# Patient Record
Sex: FEMALE | URBAN - METROPOLITAN AREA
[De-identification: names, ages, dates, MRNs, and addresses within clinical notes are randomized per-mention and may not be internally consistent; named-entity substitution may affect disease eponyms.]

---

## 2021-07-21 ENCOUNTER — APPOINTMENT (RX ONLY)
Dept: URBAN - METROPOLITAN AREA CLINIC 87 | Facility: CLINIC | Age: 71
Setting detail: DERMATOLOGY
End: 2021-07-21

## 2021-07-21 VITALS — TEMPERATURE: 97.5 F

## 2021-07-21 DIAGNOSIS — L03.01 CELLULITIS OF FINGER: ICD-10-CM

## 2021-07-21 PROBLEM — L03.012 CELLULITIS OF LEFT FINGER: Status: ACTIVE | Noted: 2021-07-21

## 2021-07-21 PROCEDURE — ? TREATMENT REGIMEN

## 2021-07-21 PROCEDURE — ? PRESCRIPTION

## 2021-07-21 PROCEDURE — 99203 OFFICE O/P NEW LOW 30 MIN: CPT

## 2021-07-21 PROCEDURE — ? DIAGNOSIS COMMENT

## 2021-07-21 PROCEDURE — ? COUNSELING

## 2021-07-21 RX ORDER — DOXYCYCLINE HYCLATE 100 MG/1
CAPSULE, GELATIN COATED ORAL
Qty: 28 | Refills: 0 | Status: ERX | COMMUNITY
Start: 2021-07-21

## 2021-07-21 RX ADMIN — DOXYCYCLINE HYCLATE: 100 CAPSULE, GELATIN COATED ORAL at 00:00

## 2021-07-21 ASSESSMENT — LOCATION ZONE DERM: LOCATION ZONE: FINGERNAIL

## 2021-07-21 ASSESSMENT — LOCATION SIMPLE DESCRIPTION DERM: LOCATION SIMPLE: LEFT SMALL FINGERNAIL

## 2021-07-21 ASSESSMENT — LOCATION DETAILED DESCRIPTION DERM: LOCATION DETAILED: LEFT SMALL FINGERNAIL

## 2021-07-21 NOTE — PROCEDURE: DIAGNOSIS COMMENT
Render Risk Assessment In Note?: no
Detail Level: Simple
Comment: If not improved at f/u will refer pt for biopsy to r/o SCC

## 2021-07-21 NOTE — PROCEDURE: TREATMENT REGIMEN
Samples Given: Impoyz - apply to affected nail BID
Detail Level: Zone
Plan: if nail fails to resolve with topical, pt will need to be referred for nail biopsy
Initiate Treatment: Doxycycline 100mg - 1 PO BID x 14 days

## 2021-08-04 ENCOUNTER — APPOINTMENT (RX ONLY)
Dept: URBAN - METROPOLITAN AREA CLINIC 87 | Facility: CLINIC | Age: 71
Setting detail: DERMATOLOGY
End: 2021-08-04

## 2021-08-04 DIAGNOSIS — D485 NEOPLASM OF UNCERTAIN BEHAVIOR OF SKIN: ICD-10-CM

## 2021-08-04 PROBLEM — D48.5 NEOPLASM OF UNCERTAIN BEHAVIOR OF SKIN: Status: ACTIVE | Noted: 2021-08-04

## 2021-08-04 PROCEDURE — ? REFERRAL

## 2021-08-04 PROCEDURE — 99213 OFFICE O/P EST LOW 20 MIN: CPT

## 2021-08-04 PROCEDURE — ? TREATMENT REGIMEN

## 2021-08-04 PROCEDURE — ? COUNSELING

## 2021-08-04 ASSESSMENT — LOCATION ZONE DERM: LOCATION ZONE: FINGER

## 2021-08-04 ASSESSMENT — PAIN INTENSITY VAS: HOW INTENSE IS YOUR PAIN 0 BEING NO PAIN, 10 BEING THE MOST SEVERE PAIN POSSIBLE?: NO PAIN

## 2021-08-04 ASSESSMENT — LOCATION SIMPLE DESCRIPTION DERM: LOCATION SIMPLE: LEFT SMALL FINGER

## 2021-08-04 ASSESSMENT — LOCATION DETAILED DESCRIPTION DERM: LOCATION DETAILED: LEFT SMALL FINGERNAIL

## 2021-08-04 NOTE — PROCEDURE: TREATMENT REGIMEN
Detail Level: Zone
Plan: Some improvement with the inflammation around the nail but a growth can be seen today. Gave option of monitoring or seeing a hand surgeon or seeing dr Mtz for a nail biopsy. Referral sent to [facility].

## 2022-06-19 ENCOUNTER — HOSPITAL ENCOUNTER (INPATIENT)
Facility: HOSPITAL | Age: 72
LOS: 2 days | Discharge: HOME | DRG: 440 | End: 2022-06-21
Attending: EMERGENCY MEDICINE | Admitting: INTERNAL MEDICINE
Payer: COMMERCIAL

## 2022-06-19 ENCOUNTER — APPOINTMENT (EMERGENCY)
Dept: RADIOLOGY | Facility: HOSPITAL | Age: 72
DRG: 440 | End: 2022-06-19
Attending: EMERGENCY MEDICINE
Payer: COMMERCIAL

## 2022-06-19 DIAGNOSIS — R79.89 ELEVATED SERUM CREATININE: ICD-10-CM

## 2022-06-19 DIAGNOSIS — K85.90 ACUTE PANCREATITIS, UNSPECIFIED COMPLICATION STATUS, UNSPECIFIED PANCREATITIS TYPE: Primary | ICD-10-CM

## 2022-06-19 PROBLEM — K21.9 GERD (GASTROESOPHAGEAL REFLUX DISEASE): Status: ACTIVE | Noted: 2022-06-19

## 2022-06-19 PROBLEM — I10 HYPERTENSION: Status: ACTIVE | Noted: 2022-06-19

## 2022-06-19 PROBLEM — E11.9 TYPE 2 DIABETES MELLITUS (CMS/HCC): Status: ACTIVE | Noted: 2022-06-19

## 2022-06-19 PROBLEM — E03.9 HYPOTHYROIDISM: Status: ACTIVE | Noted: 2022-06-19

## 2022-06-19 PROBLEM — N17.9 AKI (ACUTE KIDNEY INJURY) (CMS/HCC): Status: ACTIVE | Noted: 2022-06-19

## 2022-06-19 LAB
ALBUMIN SERPL-MCNC: 3.6 G/DL (ref 3.4–5)
ALP SERPL-CCNC: 59 IU/L (ref 35–126)
ALT SERPL-CCNC: 12 IU/L (ref 11–54)
ANION GAP SERPL CALC-SCNC: 12 MEQ/L (ref 3–15)
AST SERPL-CCNC: 19 IU/L (ref 15–41)
BACTERIA URNS QL MICRO: ABNORMAL /HPF
BACTERIA URNS QL MICRO: ABNORMAL /HPF
BASOPHILS # BLD: 0.02 K/UL (ref 0.01–0.1)
BASOPHILS NFR BLD: 0.2 %
BILIRUB SERPL-MCNC: 0.6 MG/DL (ref 0.3–1.2)
BILIRUB UR QL STRIP.AUTO: NEGATIVE MG/DL
BILIRUB UR QL STRIP.AUTO: NEGATIVE MG/DL
BUN SERPL-MCNC: 18 MG/DL (ref 8–20)
CALCIUM SERPL-MCNC: 9.1 MG/DL (ref 8.9–10.3)
CHLORIDE SERPL-SCNC: 99 MEQ/L (ref 98–109)
CLARITY UR REFRACT.AUTO: CLEAR
CLARITY UR REFRACT.AUTO: CLEAR
CO2 SERPL-SCNC: 24 MEQ/L (ref 22–32)
COLOR UR AUTO: COLORLESS
COLOR UR AUTO: COLORLESS
CREAT SERPL-MCNC: 1.3 MG/DL (ref 0.6–1.1)
CREAT UR-MCNC: 25.2 MG/DL
DIFFERENTIAL METHOD BLD: ABNORMAL
EOSINOPHIL # BLD: 0.34 K/UL (ref 0.04–0.36)
EOSINOPHIL NFR BLD: 4.1 %
ERYTHROCYTE [DISTWIDTH] IN BLOOD BY AUTOMATED COUNT: 12.9 % (ref 11.7–14.4)
GFR SERPL CREATININE-BSD FRML MDRD: 40.2 ML/MIN/1.73M*2
GLUCOSE BLD-MCNC: 100 MG/DL (ref 70–99)
GLUCOSE SERPL-MCNC: 161 MG/DL (ref 70–99)
GLUCOSE UR STRIP.AUTO-MCNC: NEGATIVE MG/DL
GLUCOSE UR STRIP.AUTO-MCNC: NEGATIVE MG/DL
HCT VFR BLDCO AUTO: 36.2 % (ref 35–45)
HGB BLD-MCNC: 11.5 G/DL (ref 11.8–15.7)
HGB UR QL STRIP.AUTO: NEGATIVE
HGB UR QL STRIP.AUTO: NEGATIVE
HYALINE CASTS #/AREA URNS LPF: ABNORMAL /LPF
HYALINE CASTS #/AREA URNS LPF: ABNORMAL /LPF
IMM GRANULOCYTES # BLD AUTO: 0.02 K/UL (ref 0–0.08)
IMM GRANULOCYTES NFR BLD AUTO: 0.2 %
KETONES UR STRIP.AUTO-MCNC: NEGATIVE MG/DL
KETONES UR STRIP.AUTO-MCNC: NEGATIVE MG/DL
LEUKOCYTE ESTERASE UR QL STRIP.AUTO: NEGATIVE
LEUKOCYTE ESTERASE UR QL STRIP.AUTO: NEGATIVE
LIPASE SERPL-CCNC: 242 U/L (ref 20–51)
LYMPHOCYTES # BLD: 1.37 K/UL (ref 1.2–3.5)
LYMPHOCYTES NFR BLD: 16.7 %
MCH RBC QN AUTO: 28 PG (ref 28–33.2)
MCHC RBC AUTO-ENTMCNC: 31.8 G/DL (ref 32.2–35.5)
MCV RBC AUTO: 88.3 FL (ref 83–98)
MONOCYTES # BLD: 0.6 K/UL (ref 0.28–0.8)
MONOCYTES NFR BLD: 7.3 %
MUCOUS THREADS URNS QL MICRO: ABNORMAL /LPF
NEUTROPHILS # BLD: 5.85 K/UL (ref 1.7–7)
NEUTS SEG NFR BLD: 71.5 %
NITRITE UR QL STRIP.AUTO: NEGATIVE
NITRITE UR QL STRIP.AUTO: NEGATIVE
NRBC BLD-RTO: 0 %
PDW BLD AUTO: 10.3 FL (ref 9.4–12.3)
PH UR STRIP.AUTO: 6.5 [PH]
PH UR STRIP.AUTO: 7 [PH]
PLATELET # BLD AUTO: 279 K/UL (ref 150–369)
POCT TEST: ABNORMAL
POTASSIUM SERPL-SCNC: 3.7 MEQ/L (ref 3.6–5.1)
PROT SERPL-MCNC: 6.8 G/DL (ref 6–8.2)
PROT UR QL STRIP.AUTO: 1
PROT UR QL STRIP.AUTO: 1
RBC # BLD AUTO: 4.1 M/UL (ref 3.93–5.22)
RBC #/AREA URNS HPF: ABNORMAL /HPF
RBC #/AREA URNS HPF: ABNORMAL /HPF
SARS-COV-2 RNA RESP QL NAA+PROBE: NEGATIVE
SODIUM SERPL-SCNC: 135 MEQ/L (ref 136–144)
SODIUM UR-SCNC: 99 MEQ/L
SP GR UR REFRACT.AUTO: 1.01
SP GR UR REFRACT.AUTO: 1.01
SQUAMOUS URNS QL MICRO: ABNORMAL /HPF
SQUAMOUS URNS QL MICRO: ABNORMAL /HPF
TROPONIN I SERPL HS-MCNC: 12.6 PG/ML
TROPONIN I SERPL HS-MCNC: 13.7 PG/ML
UROBILINOGEN UR STRIP-ACNC: 0.2 EU/DL
UROBILINOGEN UR STRIP-ACNC: 0.2 EU/DL
WBC # BLD AUTO: 8.2 K/UL (ref 3.8–10.5)
WBC #/AREA URNS HPF: ABNORMAL /HPF
WBC #/AREA URNS HPF: ABNORMAL /HPF

## 2022-06-19 PROCEDURE — 93005 ELECTROCARDIOGRAM TRACING: CPT | Performed by: PHYSICIAN ASSISTANT

## 2022-06-19 PROCEDURE — 85025 COMPLETE CBC W/AUTO DIFF WBC: CPT | Performed by: EMERGENCY MEDICINE

## 2022-06-19 PROCEDURE — U0002 COVID-19 LAB TEST NON-CDC: HCPCS | Performed by: PHYSICIAN ASSISTANT

## 2022-06-19 PROCEDURE — 12000000 HC ROOM AND CARE MED/SURG

## 2022-06-19 PROCEDURE — 20600000 HC ROOM AND CARE INTERMEDIATE/TELEMETRY

## 2022-06-19 PROCEDURE — 99285 EMERGENCY DEPT VISIT HI MDM: CPT | Mod: 25

## 2022-06-19 PROCEDURE — 84300 ASSAY OF URINE SODIUM: CPT | Performed by: INTERNAL MEDICINE

## 2022-06-19 PROCEDURE — 96376 TX/PRO/DX INJ SAME DRUG ADON: CPT

## 2022-06-19 PROCEDURE — 84484 ASSAY OF TROPONIN QUANT: CPT | Performed by: PHYSICIAN ASSISTANT

## 2022-06-19 PROCEDURE — 83690 ASSAY OF LIPASE: CPT | Performed by: EMERGENCY MEDICINE

## 2022-06-19 PROCEDURE — 84484 ASSAY OF TROPONIN QUANT: CPT | Mod: 91 | Performed by: PHYSICIAN ASSISTANT

## 2022-06-19 PROCEDURE — 81003 URINALYSIS AUTO W/O SCOPE: CPT | Performed by: PHYSICIAN ASSISTANT

## 2022-06-19 PROCEDURE — 74176 CT ABD & PELVIS W/O CONTRAST: CPT | Mod: MG

## 2022-06-19 PROCEDURE — 81001 URINALYSIS AUTO W/SCOPE: CPT | Performed by: INTERNAL MEDICINE

## 2022-06-19 PROCEDURE — 25800000 HC PHARMACY IV SOLUTIONS: Performed by: PHYSICIAN ASSISTANT

## 2022-06-19 PROCEDURE — 81001 URINALYSIS AUTO W/SCOPE: CPT | Performed by: PHYSICIAN ASSISTANT

## 2022-06-19 PROCEDURE — 96374 THER/PROPH/DIAG INJ IV PUSH: CPT

## 2022-06-19 PROCEDURE — 63600000 HC DRUGS/DETAIL CODE: Performed by: INTERNAL MEDICINE

## 2022-06-19 PROCEDURE — 82570 ASSAY OF URINE CREATININE: CPT | Performed by: INTERNAL MEDICINE

## 2022-06-19 PROCEDURE — 25800000 HC PHARMACY IV SOLUTIONS: Performed by: INTERNAL MEDICINE

## 2022-06-19 PROCEDURE — 63600000 HC DRUGS/DETAIL CODE: Mod: JZ | Performed by: PHYSICIAN ASSISTANT

## 2022-06-19 PROCEDURE — 25000000 HC PHARMACY GENERAL: Performed by: INTERNAL MEDICINE

## 2022-06-19 PROCEDURE — 96375 TX/PRO/DX INJ NEW DRUG ADDON: CPT | Mod: 59

## 2022-06-19 PROCEDURE — 80053 COMPREHEN METABOLIC PANEL: CPT | Performed by: EMERGENCY MEDICINE

## 2022-06-19 PROCEDURE — 36415 COLL VENOUS BLD VENIPUNCTURE: CPT | Performed by: EMERGENCY MEDICINE

## 2022-06-19 PROCEDURE — 96361 HYDRATE IV INFUSION ADD-ON: CPT

## 2022-06-19 PROCEDURE — 99222 1ST HOSP IP/OBS MODERATE 55: CPT | Performed by: INTERNAL MEDICINE

## 2022-06-19 RX ORDER — HYDRALAZINE HYDROCHLORIDE 20 MG/ML
5 INJECTION INTRAMUSCULAR; INTRAVENOUS EVERY 8 HOURS PRN
Status: DISCONTINUED | OUTPATIENT
Start: 2022-06-19 | End: 2022-06-21 | Stop reason: HOSPADM

## 2022-06-19 RX ORDER — DEXTROSE MONOHYDRATE AND SODIUM CHLORIDE 5; .45 G/100ML; G/100ML
INJECTION, SOLUTION INTRAVENOUS CONTINUOUS
Status: DISCONTINUED | OUTPATIENT
Start: 2022-06-19 | End: 2022-06-20

## 2022-06-19 RX ORDER — OMEPRAZOLE 20 MG/1
20 CAPSULE, DELAYED RELEASE ORAL
COMMUNITY

## 2022-06-19 RX ORDER — METFORMIN HYDROCHLORIDE 500 MG/1
500 TABLET ORAL 2 TIMES DAILY WITH MEALS
COMMUNITY

## 2022-06-19 RX ORDER — INSULIN ASPART 100 [IU]/ML
3-5 INJECTION, SOLUTION INTRAVENOUS; SUBCUTANEOUS 3 TIMES DAILY
Status: DISCONTINUED | OUTPATIENT
Start: 2022-06-19 | End: 2022-06-21 | Stop reason: HOSPADM

## 2022-06-19 RX ORDER — MORPHINE SULFATE 2 MG/ML
2 INJECTION, SOLUTION INTRAMUSCULAR; INTRAVENOUS ONCE
Status: COMPLETED | OUTPATIENT
Start: 2022-06-19 | End: 2022-06-19

## 2022-06-19 RX ORDER — SIMVASTATIN 20 MG/1
20 TABLET, FILM COATED ORAL NIGHTLY
COMMUNITY

## 2022-06-19 RX ORDER — ONDANSETRON HYDROCHLORIDE 2 MG/ML
4 INJECTION, SOLUTION INTRAVENOUS ONCE
Status: COMPLETED | OUTPATIENT
Start: 2022-06-19 | End: 2022-06-19

## 2022-06-19 RX ORDER — GABAPENTIN 300 MG/1
300 CAPSULE ORAL NIGHTLY
COMMUNITY

## 2022-06-19 RX ORDER — ONDANSETRON HYDROCHLORIDE 2 MG/ML
4 INJECTION, SOLUTION INTRAVENOUS EVERY 8 HOURS PRN
Status: DISCONTINUED | OUTPATIENT
Start: 2022-06-19 | End: 2022-06-21 | Stop reason: HOSPADM

## 2022-06-19 RX ORDER — PANTOPRAZOLE SODIUM 40 MG/10ML
40 INJECTION, POWDER, LYOPHILIZED, FOR SOLUTION INTRAVENOUS EVERY 24 HOURS
Status: DISCONTINUED | OUTPATIENT
Start: 2022-06-19 | End: 2022-06-21 | Stop reason: HOSPADM

## 2022-06-19 RX ORDER — MORPHINE SULFATE 2 MG/ML
2 INJECTION, SOLUTION INTRAMUSCULAR; INTRAVENOUS EVERY 4 HOURS PRN
Status: DISCONTINUED | OUTPATIENT
Start: 2022-06-19 | End: 2022-06-21 | Stop reason: HOSPADM

## 2022-06-19 RX ORDER — METOPROLOL SUCCINATE 25 MG/1
25 TABLET, EXTENDED RELEASE ORAL DAILY
COMMUNITY

## 2022-06-19 RX ORDER — MORPHINE SULFATE 2 MG/ML
1 INJECTION, SOLUTION INTRAMUSCULAR; INTRAVENOUS ONCE
Status: COMPLETED | OUTPATIENT
Start: 2022-06-19 | End: 2022-06-19

## 2022-06-19 RX ORDER — LISINOPRIL 20 MG/1
20 TABLET ORAL DAILY
COMMUNITY

## 2022-06-19 RX ADMIN — SODIUM CHLORIDE 500 ML: 9 INJECTION, SOLUTION INTRAVENOUS at 17:00

## 2022-06-19 RX ADMIN — PANTOPRAZOLE SODIUM 40 MG: 40 INJECTION, POWDER, FOR SOLUTION INTRAVENOUS at 20:49

## 2022-06-19 RX ADMIN — ONDANSETRON HYDROCHLORIDE 4 MG: 2 INJECTION, SOLUTION INTRAMUSCULAR; INTRAVENOUS at 18:21

## 2022-06-19 RX ADMIN — MORPHINE SULFATE 1 MG: 2 INJECTION, SOLUTION INTRAMUSCULAR; INTRAVENOUS at 18:21

## 2022-06-19 RX ADMIN — MORPHINE SULFATE 2 MG: 2 INJECTION, SOLUTION INTRAMUSCULAR; INTRAVENOUS at 19:31

## 2022-06-19 RX ADMIN — DEXTROSE AND SODIUM CHLORIDE: 5; 450 INJECTION, SOLUTION INTRAVENOUS at 20:47

## 2022-06-19 RX ADMIN — HYDRALAZINE HYDROCHLORIDE 5 MG: 20 INJECTION INTRAMUSCULAR; INTRAVENOUS at 20:18

## 2022-06-19 ASSESSMENT — ENCOUNTER SYMPTOMS
DIFFICULTY URINATING: 0
APPETITE CHANGE: 1
ANAL BLEEDING: 0
DIARRHEA: 0
FLANK PAIN: 0
BLOOD IN STOOL: 0
DYSURIA: 0
ABDOMINAL PAIN: 1
VOMITING: 0
CONSTIPATION: 0
CHEST TIGHTNESS: 0
NAUSEA: 0
CHILLS: 0
BACK PAIN: 0
FEVER: 0
SHORTNESS OF BREATH: 0
COLOR CHANGE: 0

## 2022-06-19 ASSESSMENT — PATIENT HEALTH QUESTIONNAIRE - PHQ9: SUM OF ALL RESPONSES TO PHQ9 QUESTIONS 1 & 2: 0

## 2022-06-19 NOTE — ED PROVIDER NOTES
Emergency Medicine Note  HPI   HISTORY OF PRESENT ILLNESS     71-year-old female presents emergency room for evaluation of left upper quadrant abdominal pain.  Symptoms began 5 days ago as a mild gnawing pain, but pain significantly worsened last night and she was doubled over.  No history of similar pain in this location, although she does have a history of gallstone pancreatitis 5 years ago.  Pain does not radiate into the chest or back.  She took Tums, Maalox and Pepcid without improvement, Tylenol improved her symptoms somewhat today.  Her last bowel movement was on Friday and was normal. She denies fever, chills, nausea, vomiting, diarrhea, constipation, hematochezia or urinary symptoms.       History provided by:  Patient and relative        Patient History   PAST HISTORY     Reviewed from Nursing Triage:         No past medical history on file.    No past surgical history on file.    No family history on file.           Review of Systems   REVIEW OF SYSTEMS     Review of Systems   Constitutional: Positive for appetite change (decreased). Negative for chills and fever.   Respiratory: Negative for chest tightness and shortness of breath.    Cardiovascular: Negative for chest pain.   Gastrointestinal: Positive for abdominal pain. Negative for anal bleeding, blood in stool, constipation, diarrhea, nausea and vomiting.   Genitourinary: Negative for difficulty urinating, dysuria and flank pain.   Musculoskeletal: Negative for back pain.   Skin: Negative for color change.   Allergic/Immunologic: Negative for immunocompromised state.   Neurological: Negative for syncope.   All other systems reviewed and are negative.        VITALS     ED Vitals    Date/Time Temp Pulse Resp BP SpO2 [facility]   06/19/22 2034 36.7 °C (98 °F) 94 18 160/81 95 % RPF   06/19/22 1933 -- 78 20 208/92 98 % CA   06/19/22 1624 36.6 °C (97.9 °F) 96 18 161/76 100 % TD        Pulse Ox %: 100 % (06/19/22 1701)  Pulse Ox Interpretation: Normal (06/19/22  1701)  Heart Rate: 96 (06/19/22 1625)  Rhythm Strip Interpretation: Normal Sinus Rhythm (06/19/22 1625)     Physical Exam   PHYSICAL EXAM     Physical Exam  Vitals and nursing note reviewed.   Constitutional:       General: She is not in acute distress.     Appearance: Normal appearance. She is well-developed. She is not ill-appearing.   HENT:      Head: Normocephalic and atraumatic.   Eyes:      General: No scleral icterus.     Conjunctiva/sclera: Conjunctivae normal.   Cardiovascular:      Rate and Rhythm: Normal rate and regular rhythm.      Pulses: Normal pulses.      Heart sounds: Normal heart sounds.   Pulmonary:      Effort: Pulmonary effort is normal.      Breath sounds: Normal breath sounds.   Abdominal:      General: Abdomen is flat. Bowel sounds are normal. There is no distension.      Palpations: Abdomen is soft.      Tenderness: There is abdominal tenderness (LUQ). There is no guarding or rebound.   Musculoskeletal:         General: Normal range of motion.   Skin:     General: Skin is warm and dry.   Neurological:      General: No focal deficit present.      Mental Status: She is alert and oriented to person, place, and time.           PROCEDURES     Procedures     DATA     Results     Procedure Component Value Units Date/Time    SARS-CoV-2 (COVID-19), PCR Nasopharynx [[id number]]  (Normal) Collected: 06/19/22 1835    Specimen: Nasopharyngeal Swab from Nasopharynx Updated: 06/19/22 1913    Narrative:      The following orders were created for panel order SARS-CoV-2 (COVID-19), PCR Nasopharynx.  Procedure                               Abnormality         Status                     ---------                               -----------         ------                     SARS-CoV-2 (COVID-19), P...[[id number]]  Normal              Final result                 Please view results for these tests on the individual orders.    SARS-CoV-2 (COVID-19), PCR Nasopharynx [[id number]]  (Normal) Collected: 06/19/22 1835     Specimen: Nasopharyngeal Swab from Nasopharynx Updated: 06/19/22 1913     SARS-CoV-2 (COVID-19) Negative    Narrative:      Nursing instructions: Obtain nasopharyngeal swab ONLY.  Send swab in viral transport media. If RSV/FLU swab is also ordered, both Covid and RSV/FLU can be performed on single swab.    UA with reflex culture [[id number]]  (Abnormal) Collected: 06/19/22 1836    Specimen: Urine, Clean Catch Updated: 06/19/22 1900    Narrative:      The following orders were created for panel order UA with reflex culture.  Procedure                               Abnormality         Status                     ---------                               -----------         ------                     UA Reflex to Culture (Ma...[[id number]]  Abnormal            Final result               UA Microscopic[[id number]]               Abnormal            Final result                 Please view results for these tests on the individual orders.    UA Microscopic [[id number]]  (Abnormal) Collected: 06/19/22 1836    Specimen: Urine, Clean Catch Updated: 06/19/22 1900     RBC, Urine 0 TO 4 /HPF      WBC, Urine 0 TO 3 /HPF      Squamous Epithelial None Seen /hpf      Hyaline Cast None Seen /lpf      Bacteria, Urine None Seen /HPF      MUCUSUA Rare /LPF     UA Reflex to Culture (Macroscopic) [[id number]]  (Abnormal) Collected: 06/19/22 1836    Specimen: Urine, Clean Catch Updated: 06/19/22 1859     Color, Urine Colorless     Clarity, Urine Clear     Specific Gravity, Urine 1.006     pH, Urine 6.5     Leukocyte Esterase Negative     Comment: Results can be falsely negative due to high specific gravity, some antibiotics, glucose >3 g/dl, or WBC other than neutrophils.        Nitrite, Urine Negative     Protein, Urine +1     Glucose, Urine Negative mg/dL      Ketones, Urine Negative mg/dL      Urobilinogen, Urine 0.2 EU/dL      Bilirubin, Urine Negative mg/dL      Blood, Urine Negative     Comment: The sensitivity of the occult blood test is  equivalent to approximately 4 intact RBC/HPF.       Lipase, if pain is above umbilicus [[id number]]  (Abnormal) Collected: 06/19/22 1649    Specimen: Blood, Venous Updated: 06/19/22 1737     Lipase 242 U/L      Comment: Checked by dilution         Narrative:      Order only if pain is above umbilicus    HS Troponin I (with 2 hour reflex) [[id number]]  (Normal) Collected: 06/19/22 1649    Specimen: Blood, Venous Updated: 06/19/22 1732     High Sens Troponin I 12.60 pg/mL     Comprehensive metabolic panel [[id number]]  (Abnormal) Collected: 06/19/22 1649    Specimen: Blood, Venous Updated: 06/19/22 1726     Sodium 135 mEQ/L      Potassium 3.7 mEQ/L      Chloride 99 mEQ/L      CO2 24 mEQ/L      BUN 18 mg/dL      Creatinine 1.3 mg/dL      Glucose 161 mg/dL      Calcium 9.1 mg/dL      AST (SGOT) 19 IU/L      ALT (SGPT) 12 IU/L      Alkaline Phosphatase 59 IU/L      Total Protein 6.8 g/dL      Albumin 3.6 g/dL      Bilirubin, Total 0.6 mg/dL      eGFR 40.2 mL/min/1.73m*2      Anion Gap 12 mEQ/L     Narrative:      Order only if pain is above umbilicus    CBC and differential [[id number]]  (Abnormal) Collected: 06/19/22 1649    Specimen: Blood, Venous Updated: 06/19/22 1701     WBC 8.20 K/uL      RBC 4.10 M/uL      Hemoglobin 11.5 g/dL      Hematocrit 36.2 %      MCV 88.3 fL      MCH 28.0 pg      MCHC 31.8 g/dL      RDW 12.9 %      Platelets 279 K/uL      MPV 10.3 fL      Differential Type Auto     nRBC 0.0 %      Immature Granulocytes 0.2 %      Neutrophils 71.5 %      Lymphocytes 16.7 %      Monocytes 7.3 %      Eosinophils 4.1 %      Basophils 0.2 %      Immature Granulocytes, Absolute 0.02 K/uL      Neutrophils, Absolute 5.85 K/uL      Lymphocytes, Absolute 1.37 K/uL      Monocytes, Absolute 0.60 K/uL      Eosinophils, Absolute 0.34 K/uL      Basophils, Absolute 0.02 K/uL           Imaging Results          CT ABDOMEN PELVIS WITHOUT IV CONTRAST (Final result)  Result time 06/19/22 17:54:38    Final result                  Impression:    IMPRESSION:  Edema and diffuse stranding surrounding the pancreas in keeping with acute  pancreatitis.  There are some areas of low attenuation within the pancreatic  body for which developing necrosis is not excluded.  Mild fullness of the right collecting system without evidence of obstructing  calculus.             Narrative:    CLINICAL HISTORY: LUQ abdominal pain  h/o pancreatitis    COMPARISON: None available    COMMENT:    TECHNIQUE: CT of the abdomen and pelvis was performed with the patient in the  supine position. Images reconstructed/reformatted in the axial, coronal and  sagittal planes.  CT DOSE:  One or more dose reduction techniques (e.g. automated exposure  control, adjustment of the mA and/or kV according to patient size, use of  iterative reconstruction technique) utilized for this examination.  ORAL CONTRAST: None  IV contrast: None  The lack of intravenous contrast limits interpretation of the solid organs,  vessels and certain processes.    LOWER CHEST: Within normal limits.    LIVER: Within normal limits.  BILE DUCTS: Normal caliber.  GALLBLADDER: No calcified gallstones. Normal caliber wall.  PANCREAS: There is edema and diffuse stranding surrounding the pancreas in  keeping with acute pancreatitis.  There are some areas of low attenuation within  the pancreatic body for which developing necrosis is not excluded.  SPLEEN: Within normal limits.  ADRENALS: Nodular thickening of the adrenal glands bilaterally.  KIDNEYS/URETERS/BLADDER: Mild fullness of the right collecting system without  evidence of obstructing calculus.    REPRODUCTIVE ORGANS: Unremarkable.    BOWEL: Appendix is visualized and unremarkable. Normal bowel caliber.  PERITONEUM: No ascites or free air, no fluid collection  VESSELS: Atherosclerotic disease of the normal caliber aorta.  LYMPH NODES: Prominent lymph nodes in the upper abdominal mesentery are likely  reactive.  ABDOMINAL WALL: Fat-containing ventral  hernia.  BONES: Multilevel degenerative changes in the spine.                                ECG 12 lead    (Results Pending)       Scoring tools                                 ED Course & MDM   MDM / ED COURSE / CLINICAL IMPRESSIONS / DISPO     MDM    ED Course as of 06/19/22 2108   Sun Jun 19, 2022   1704 WBC: 8.20 [EK]   1724 Patient declined pain medication at the time of my examination stating she had just taken tylenol PTA [EK]   1727 Creatinine(!): 1.3  Unknown baseline renal function, providing IV fluids. Also encouraged to provide urine sample. Patient was transported to CT [EK]   1744 Lipase(!): 242 [EK]   1758 CT ABDOMEN PELVIS WITHOUT IV CONTRAST  --  IMPRESSION:  Edema and diffuse stranding surrounding the pancreas in keeping with acute  pancreatitis.  There are some areas of low attenuation within the pancreatic  body for which developing necrosis is not excluded.  Mild fullness of the right collecting system without evidence of obstructing  calculus. [EK]   1822 Dr. Polanco HMS called, nightteam will take over care of the patient starting at 7pm [EK]      ED Course User Index  [EK] Taylor Kaur PA C         Clinical Impressions as of 06/19/22 2108   Acute pancreatitis, unspecified complication status, unspecified pancreatitis type   Elevated serum creatinine              Taylor Kaur PA C  06/19/22 2108

## 2022-06-19 NOTE — ED ATTESTATION NOTE
"Physician Attestation:     I personally saw and evaluated the patient, participated in the management, and agree with the findings in the above note except as where stated.  The Physician Assistant and I discussed  the case, workup, and disposition.      Chief Complaint  Chief Complaint   Patient presents with    Abdominal Pain       70 yo F presents to the ED for eval of LUQ abd pain   No n/v/d  Constipated x 2 days  Tried tlyneol maalox and tums wo improvement  At times \"doubling over\" in pain  Hx of gallstone pancreatitis   Still has gallbladder  No uti symptoms  No fever    Non toxic, nad  Mild LUQ abd tenderness, non peritoneal x 4  No ruq neg murphys sign  abd is soft  rrr  Lungs cta  No cva ttp  Anicteric sclera  No jaundice      Parker Blanc DO  06/19/22 1703    "

## 2022-06-20 ENCOUNTER — APPOINTMENT (INPATIENT)
Dept: RADIOLOGY | Facility: HOSPITAL | Age: 72
DRG: 440 | End: 2022-06-20
Attending: PHYSICIAN ASSISTANT
Payer: COMMERCIAL

## 2022-06-20 PROBLEM — K85.90 PANCREATITIS: Status: ACTIVE | Noted: 2022-06-20

## 2022-06-20 PROBLEM — R10.9 ABDOMINAL PAIN: Status: ACTIVE | Noted: 2022-06-20

## 2022-06-20 LAB
ALBUMIN SERPL-MCNC: 3 G/DL (ref 3.4–5)
ALP SERPL-CCNC: 52 IU/L (ref 35–126)
ALT SERPL-CCNC: 11 IU/L (ref 11–54)
ANION GAP SERPL CALC-SCNC: 7 MEQ/L (ref 3–15)
AST SERPL-CCNC: 16 IU/L (ref 15–41)
ATRIAL RATE: 80
BILIRUB SERPL-MCNC: 0.6 MG/DL (ref 0.3–1.2)
BUN SERPL-MCNC: 12 MG/DL (ref 8–20)
CALCIUM SERPL-MCNC: 8.8 MG/DL (ref 8.9–10.3)
CHLORIDE SERPL-SCNC: 105 MEQ/L (ref 98–109)
CO2 SERPL-SCNC: 29 MEQ/L (ref 22–32)
CREAT SERPL-MCNC: 1.1 MG/DL (ref 0.6–1.1)
GFR SERPL CREATININE-BSD FRML MDRD: 48.7 ML/MIN/1.73M*2
GLUCOSE BLD-MCNC: 113 MG/DL (ref 70–99)
GLUCOSE BLD-MCNC: 123 MG/DL (ref 70–99)
GLUCOSE BLD-MCNC: 76 MG/DL (ref 70–99)
GLUCOSE BLD-MCNC: 87 MG/DL (ref 70–99)
GLUCOSE SERPL-MCNC: 132 MG/DL (ref 70–99)
LIPASE SERPL-CCNC: 98 U/L (ref 20–51)
P AXIS: 59
POCT TEST: ABNORMAL
POCT TEST: ABNORMAL
POCT TEST: NORMAL
POCT TEST: NORMAL
POTASSIUM SERPL-SCNC: 3.9 MEQ/L (ref 3.6–5.1)
PR INTERVAL: 154
PROT SERPL-MCNC: 5.9 G/DL (ref 6–8.2)
QRS DURATION: 68
QT INTERVAL: 410
QTC CALCULATION(BAZETT): 472
R AXIS: 75
SODIUM SERPL-SCNC: 141 MEQ/L (ref 136–144)
T WAVE AXIS: 74
TRIGL SERPL-MCNC: 74 MG/DL (ref 30–149)
VENTRICULAR RATE: 80

## 2022-06-20 PROCEDURE — 93010 ELECTROCARDIOGRAM REPORT: CPT | Performed by: STUDENT IN AN ORGANIZED HEALTH CARE EDUCATION/TRAINING PROGRAM

## 2022-06-20 PROCEDURE — 80053 COMPREHEN METABOLIC PANEL: CPT | Performed by: INTERNAL MEDICINE

## 2022-06-20 PROCEDURE — 83690 ASSAY OF LIPASE: CPT | Performed by: INTERNAL MEDICINE

## 2022-06-20 PROCEDURE — 63700000 HC SELF-ADMINISTRABLE DRUG: Performed by: STUDENT IN AN ORGANIZED HEALTH CARE EDUCATION/TRAINING PROGRAM

## 2022-06-20 PROCEDURE — 82787 IGG 1 2 3 OR 4 EACH: CPT | Performed by: PHYSICIAN ASSISTANT

## 2022-06-20 PROCEDURE — 25800000 HC PHARMACY IV SOLUTIONS: Performed by: INTERNAL MEDICINE

## 2022-06-20 PROCEDURE — 82784 ASSAY IGA/IGD/IGG/IGM EACH: CPT | Performed by: PHYSICIAN ASSISTANT

## 2022-06-20 PROCEDURE — 20600000 HC ROOM AND CARE INTERMEDIATE/TELEMETRY

## 2022-06-20 PROCEDURE — 63600000 HC DRUGS/DETAIL CODE: Performed by: INTERNAL MEDICINE

## 2022-06-20 PROCEDURE — 76705 ECHO EXAM OF ABDOMEN: CPT

## 2022-06-20 PROCEDURE — 36415 COLL VENOUS BLD VENIPUNCTURE: CPT | Performed by: INTERNAL MEDICINE

## 2022-06-20 PROCEDURE — 63600000 HC DRUGS/DETAIL CODE: Mod: JZ | Performed by: PHYSICIAN ASSISTANT

## 2022-06-20 PROCEDURE — 84478 ASSAY OF TRIGLYCERIDES: CPT | Performed by: PHYSICIAN ASSISTANT

## 2022-06-20 PROCEDURE — 25000000 HC PHARMACY GENERAL: Performed by: INTERNAL MEDICINE

## 2022-06-20 PROCEDURE — 99233 SBSQ HOSP IP/OBS HIGH 50: CPT | Performed by: STUDENT IN AN ORGANIZED HEALTH CARE EDUCATION/TRAINING PROGRAM

## 2022-06-20 RX ORDER — SODIUM CHLORIDE, SODIUM LACTATE, POTASSIUM CHLORIDE, CALCIUM CHLORIDE 600; 310; 30; 20 MG/100ML; MG/100ML; MG/100ML; MG/100ML
INJECTION, SOLUTION INTRAVENOUS CONTINUOUS
Status: DISCONTINUED | OUTPATIENT
Start: 2022-06-20 | End: 2022-06-21

## 2022-06-20 RX ORDER — LISINOPRIL 20 MG/1
20 TABLET ORAL DAILY
Status: DISCONTINUED | OUTPATIENT
Start: 2022-06-20 | End: 2022-06-21 | Stop reason: HOSPADM

## 2022-06-20 RX ORDER — LEVOTHYROXINE SODIUM 88 UG/1
88 TABLET ORAL
COMMUNITY

## 2022-06-20 RX ORDER — ATORVASTATIN CALCIUM 10 MG/1
10 TABLET, FILM COATED ORAL DAILY
Status: DISCONTINUED | OUTPATIENT
Start: 2022-06-20 | End: 2022-06-21 | Stop reason: HOSPADM

## 2022-06-20 RX ORDER — GABAPENTIN 300 MG/1
300 CAPSULE ORAL NIGHTLY
Status: DISCONTINUED | OUTPATIENT
Start: 2022-06-20 | End: 2022-06-21 | Stop reason: HOSPADM

## 2022-06-20 RX ORDER — LEVOTHYROXINE SODIUM 88 UG/1
88 TABLET ORAL
Status: DISCONTINUED | OUTPATIENT
Start: 2022-06-20 | End: 2022-06-21 | Stop reason: HOSPADM

## 2022-06-20 RX ORDER — METOPROLOL SUCCINATE 25 MG/1
25 TABLET, EXTENDED RELEASE ORAL DAILY
Status: DISCONTINUED | OUTPATIENT
Start: 2022-06-20 | End: 2022-06-21 | Stop reason: HOSPADM

## 2022-06-20 RX ADMIN — SODIUM CHLORIDE, POTASSIUM CHLORIDE, SODIUM LACTATE AND CALCIUM CHLORIDE: 600; 310; 30; 20 INJECTION, SOLUTION INTRAVENOUS at 16:21

## 2022-06-20 RX ADMIN — LISINOPRIL 20 MG: 20 TABLET ORAL at 08:59

## 2022-06-20 RX ADMIN — HYDRALAZINE HYDROCHLORIDE 5 MG: 20 INJECTION INTRAMUSCULAR; INTRAVENOUS at 20:43

## 2022-06-20 RX ADMIN — METOPROLOL SUCCINATE 25 MG: 25 TABLET, FILM COATED, EXTENDED RELEASE ORAL at 08:59

## 2022-06-20 RX ADMIN — ATORVASTATIN CALCIUM 10 MG: 10 TABLET, FILM COATED ORAL at 08:59

## 2022-06-20 RX ADMIN — MORPHINE SULFATE 2 MG: 2 INJECTION, SOLUTION INTRAMUSCULAR; INTRAVENOUS at 02:40

## 2022-06-20 RX ADMIN — PANTOPRAZOLE SODIUM 40 MG: 40 INJECTION, POWDER, FOR SOLUTION INTRAVENOUS at 20:38

## 2022-06-20 RX ADMIN — GABAPENTIN 300 MG: 300 CAPSULE ORAL at 21:33

## 2022-06-20 RX ADMIN — SODIUM CHLORIDE, POTASSIUM CHLORIDE, SODIUM LACTATE AND CALCIUM CHLORIDE: 600; 310; 30; 20 INJECTION, SOLUTION INTRAVENOUS at 11:06

## 2022-06-20 RX ADMIN — DEXTROSE AND SODIUM CHLORIDE: 5; 450 INJECTION, SOLUTION INTRAVENOUS at 09:18

## 2022-06-20 RX ADMIN — LEVOTHYROXINE SODIUM 88 MCG: 88 TABLET ORAL at 08:59

## 2022-06-20 RX ADMIN — MORPHINE SULFATE 2 MG: 2 INJECTION, SOLUTION INTRAMUSCULAR; INTRAVENOUS at 11:07

## 2022-06-20 NOTE — ASSESSMENT & PLAN NOTE
- Pt with history of GERD; denies any recent symptoms- pt takes Prilosec at home daily.   - Resume home regimen with Prilosec at discharge.

## 2022-06-20 NOTE — PROGRESS NOTES
Hospital Medicine Service -  Daily Progress Note       SUBJECTIVE   Interval History: Patient seen and examined this morning. Patient son at bedside. Patient reports abdominal pain improved and rates her pain 2/10 this morning. Patient reports abdominal pain relieved with PRN Morphine 2 mg IV. Patient denies chest pain, shortness of breath and N/V this morning.      OBJECTIVE      Vital signs in last 24 hours:  Temp:  [36.6 °C (97.9 °F)-36.8 °C (98.2 °F)] 36.8 °C (98.2 °F)  Heart Rate:  [] 72  Resp:  [16-20] 16  BP: (131-208)/(55-92) 164/67    Intake/Output Summary (Last 24 hours) at 6/20/2022 1230  Last data filed at 6/20/2022 0900  Gross per 24 hour   Intake 620 ml   Output --   Net 620 ml       PHYSICAL EXAMINATION      Physical Exam  Vitals and nursing note reviewed.   Constitutional:       General: She is not in acute distress.     Appearance: Normal appearance.   HENT:      Head: Normocephalic and atraumatic.   Eyes:      Extraocular Movements: Extraocular movements intact.      Pupils: Pupils are equal, round, and reactive to light.   Cardiovascular:      Rate and Rhythm: Normal rate and regular rhythm.      Pulses: Normal pulses.      Heart sounds: Normal heart sounds. No murmur heard.  Pulmonary:      Effort: Pulmonary effort is normal.      Breath sounds: Normal breath sounds.   Abdominal:      General: Bowel sounds are normal. There is no distension.      Palpations: Abdomen is soft.      Tenderness: There is abdominal tenderness in the left upper quadrant.   Musculoskeletal:         General: Normal range of motion.   Skin:     General: Skin is warm.   Neurological:      General: No focal deficit present.      Mental Status: She is alert and oriented to person, place, and time.   Psychiatric:         Mood and Affect: Mood normal.            LINES, CATHETERS, DRAINS, AIRWAYS, AND WOUNDS   Lines, Drains, and Airways:  Wounds (agree with documentation and present on admission):  Peripheral IV  (Adult) 06/19/22 Anterior;Left;Proximal Forearm (Active)   Number of days: 1     None     Comments:      LABS / IMAGING / TELE      Labs     Results from last 7 days   Lab Units 06/20/22  0346 06/19/22  1649   SODIUM mEQ/L 141 135*   POTASSIUM mEQ/L 3.9 3.7   CHLORIDE mEQ/L 105 99   CO2 mEQ/L 29 24   BUN mg/dL 12 18   CREATININE mg/dL 1.1 1.3*   CALCIUM mg/dL 8.8* 9.1   ALBUMIN g/dL 3.0* 3.6   BILIRUBIN TOTAL mg/dL 0.6 0.6   ALK PHOS IU/L 52 59   ALT IU/L 11 12   AST IU/L 16 19   GLUCOSE mg/dL 132* 161*     Results from last 7 days   Lab Units 06/19/22  1649   WBC K/uL 8.20   HEMOGLOBIN g/dL 11.5*   HEMATOCRIT % 36.2   PLATELETS K/uL 279   DIFF TYPE  Auto   NRBC % 0.0   IMM GRANULOCYTES % 0.2   NEUTROPHILS % 71.5   LYMPHOCYTES % 16.7   MONOCYTES % 7.3   EOSINOPHILS % 4.1   BASOPHILS % 0.2   IMM GRANUCOCYTES ABS K/uL 0.02   MONO ABS AUTO K/uL 0.60   EOS ABS AUTO K/uL 0.34   BASO ABS AUTO K/uL 0.02     Lipase: 98 today.     SARS-CoV-2 (COVID-19) (no units)   Date/Time Value   06/19/2022 1835 Negative       Imaging    6/19/2022: CT scan Abd/Pelvis: IMPRESSION:  Edema and diffuse stranding surrounding the pancreas in keeping with acute  pancreatitis.  There are some areas of low attenuation within the pancreatic  body for which developing necrosis is not excluded.  Mild fullness of the right collecting system without evidence of obstructing  calculus.    ECG/Telemetry  I have independently reviewed the telemetry. No events for the last 24 hours.    ASSESSMENT AND PLAN      * Acute pancreatitis, unspecified complication status, unspecified pancreatitis type  Assessment & Plan  - Per patient's son, pt c/o LUQ abdominal pain x 5 days; pt reports pain was relieved with Tylenol and TUMS at home initially but progressively gotten worse.   - Hx of pancreatitis 5 years ago-presumed gallstone pancreatitis but no ERCP done; pt resides in [place] and is visiting family.   - Lipase on admission elevated (242); Triglycerides normal  (74)   - CT scan abd/pelvis: Edema and diffuse stranding surrounding the pancreas in keeping with acute    pancreatitis.  There are some areas of low attenuation within the pancreatic    body for which developing necrosis is not excluded.    Mild fullness of the right collecting system without evidence of obstructing   calculus.  - Continue IVF ordered; change to Lactated Ringers IVF.   - Pain management: pt currently reports effective pain control with Morphine 2 mg IV PRN.   - Appreciate GI consultation.   - U/S of abd ordered per GI.         Hypothyroidism  Assessment & Plan  - Pt with hx of hypothyroidism; takes levothyroxine at home.   - Continue home medications.     GERD (gastroesophageal reflux disease)  Assessment & Plan  - Pt with history of GERD; denies any recent symptoms- pt takes Prilosec at home daily.   - Continue with Protonix IV daily.     JONE (acute kidney injury) (CMS/[facility])  Assessment & Plan  - Pt presents with acute pancreatitis and reports poor PO intake.   - Pt denies difficulty with urinating.   - Baseline Cr on admission is 1.3; AM labs Cr now 1.1.   - Suspect etiology of JONE related to poor PO take and acute dehydration. .  - Trend BMP.  - Continue IVF  - Monitor I/O   - Avoid nephrotoxic medications.       Type 2 diabetes mellitus (CMS/[facility])  Assessment & Plan  - Patient with hx of diabetes mellitus type 2- takes Metformin at home.   - Hold Metformin and monitor BG via POC.   - Check POC accucheck and administer sliding scale insulin per orders..     Hypertension  Assessment & Plan  - Patient with history of HTN.   - Pt states takes Metoprolol succinate XL and Lisinopril for management at home.    - Maintain home medications; if pt remains NPO, will order IV Hydralazine PRN if needed.   - Continue statin.   - Continue to monitor telemetry.        VTE Assessment: Padua VTE Score: 4  VTE Prophylaxis:  Current anticoagulants:    None      Code Status: Full Code      Estimated Discharge Date:  6/22/2022     Disposition Planning: Home pending GI consultation, tolerating diet.      ORTIZ Hawkins  6/20/2022

## 2022-06-20 NOTE — PLAN OF CARE
Problem: Adult Inpatient Plan of Care  Goal: Readiness for Transition of Care  Outcome: Progressing  Intervention: Mutually Develop Transition Plan  Flowsheets (Taken 6/20/2022 1019)  Anticipated Discharge Disposition: family member's home without services  Discharge Coordination/Progress: SW completed assessment with pt at bedside.  Assistive Device/Animal Currently Used at Home: none  Concerns Comments: Admit w/ acute pancreatitis  Transportation Concerns: car, none  Readmission Within the Last 30 Days: no previous admission in last 30 days  Patient/Family Anticipated Services at Transition: none  Patient/Family Anticipates Transition to: home with family  Transportation Anticipated: family or friend will provide  Concerns to be Addressed: no discharge needs identified  Patient's Choice of Community Agency(s): Denies Hx HC/SNF/[facility]  SW met with pt at bedside to introduce self and role. Pt confirms she is visiting her son from [place]. Son Perla resides in a H in [place]. Pt reports she is ind w/ amb, adl's and no use of AD/DME/O2. PCP: Dr. Helen Espinosa in [place], pharmacy, and insurance confirmed as listed. Pt denies Hx HC/SNF/[facility]. Plan for pt to return to sons home and son to transport at discharge.

## 2022-06-20 NOTE — PLAN OF CARE
Problem: Adult Inpatient Plan of Care  Goal: Plan of Care Review  Flowsheets (Taken 6/20/2022 1924)  Progress: no change  Plan of Care Reviewed With: caregiver  Outcome Summary: Per RN, pt did not tolerate clear liquid diet and is now NPO for CT of abd today. Noted elevated lipase.  Goal: consume % energy needs via mouth once po diet advances  Recommendations:  PO diet as tolerated.  Monitor po intake and labs, especially lipase.

## 2022-06-20 NOTE — CONSULTS
GI Consult       SUBJECTIVE     Andressa Whittington is a 71 y.o. female with a history of prior pancreatitis 5 yrs ago presenting for consult for acute pancreatitis. Patient admitted for Elevated serum creatinine [R79.89]  Acute pancreatitis, unspecified complication status, unspecified pancreatitis type [K85.90]. Andressa was seen in consultation at the request of Pino Polanco DO for recommendations related to Pancreatitis. Andressa presented to [facility] 6/19 for upper abdominal pain.  This started 5 days prior to admission.  Currently 5 years ago she does have a history of pancreatitis previously-this was attributed to gallstones before.  She tried taking Tums, Maalox, Pepcid at home without improvement.  No change in bowel habits.  Also associated with poor p.o. intake for 2-3 days prior to admission.  No report of nausea, vomiting, fevers, chills prior to admission.    Vitals-hypertensive up to 208 systolic blood pressure, improved this morning.  Afebrile  Labs-lipase 242, hemoglobin 11.5, WBC normal, Na+ 135, LFTs normal on admission, normal this morning, electrolytes normal, BUN/creatinine normal.  Imaging-CT AP without IV contrast-edema and diffuse stranding surrounding the pancreas in keeping with acute pancreatitis, some areas of low-attenuation within the pancreatic body, developing necrosis is not excluded, mild fullness of the right collecting system without evidence of obstructing calculus.  Liver is normal, bile ducts are normal, spleen is normal.    Patient is accompanied by her son at the bedside.  She has a history of pancreatitis 5 years ago, this was in [place] where she lives.  She did not have follow-up after this, MRCP, ERCP at time of pancreatitis.  They were told that pancreatitis was idiopathic but most likely related to gallstones.  In between pancreatitis episode she has been completely fine.  No chronic symptoms such as abdominal pain, nausea, weight loss, poor p.o. intake.  She  does not have a history of high triglycerides.  No recent over-the-counter medications, herbal supplements, recent antibiotics, new medications.  She is currently visiting her son from [place].  No recent out of the country travel, sick contacts she is aware of.  Symptoms are very similar to prior episode of pancreatitis.    She reports feeling improved this morning, only mild pain on exam.  She slept okay throughout the night.  More comfortable this morning.  She has never been confirmed to have gallstones on any imaging modality.  She has no known autoimmune conditions.  There are no genetic pancreatitis issues noted in the family.    Outside records reviewed..  All other systems were reviewed and are negative other than as stated in the HPI.    No past medical history on file.  No past surgical history on file.  Social History     Socioeconomic History    Marital status: Single     Spouse name: Not on file    Number of children: Not on file    Years of education: Not on file    Highest education level: Not on file   Occupational History    Not on file   Tobacco Use    Smoking status: Never Smoker    Smokeless tobacco: Never Used   Substance and Sexual Activity    Alcohol use: Never    Drug use: Not on file    Sexual activity: Not on file   Other Topics Concern    Not on file   Social History Narrative    Not on file     Social Determinants of Health     Financial Resource Strain: Not on file   Food Insecurity: Not on file   Transportation Needs: Not on file   Physical Activity: Not on file   Stress: Not on file   Social Connections: Not on file   Intimate Partner Violence: Not on file   Housing Stability: Not on file     No family history on file.  No Known Allergies    Home Medications:    gabapentin, Take 300 mg by mouth nightly.    levothyroxine, Take 88 mcg by mouth daily.    lisinopriL, Take 20 mg by mouth daily.    metFORMIN, Take 500 mg by mouth 2 (two) times a day with meals.    metoprolol  "succinate XL, Take 25 mg by mouth daily.    omeprazole, Take 20 mg by mouth daily before breakfast.    simvastatin, Take 20 mg by mouth nightly.    Current Medications:    atorvastatin, 10 mg, oral, Daily    D5 % and 0.45 % sodium chloride, , intravenous, Continuous    gabapentin, 300 mg, oral, Nightly    hydrALAZINE, 5 mg, intravenous, q8h PRN    insulin aspart U-100, 3-5 Units, subcutaneous, TID    levothyroxine, 88 mcg, oral, Daily (6:30a)    lisinopriL, 20 mg, oral, Daily    metoprolol succinate XL, 25 mg, oral, Daily    morphine, 2 mg, intravenous, q4h PRN    ondansetron, 4 mg, intravenous, q8h PRN    pantoprazole, 40 mg, intravenous, q24h     OBJECTIVE     Physical Exam  Visit Vitals  BP (!) 131/55 (BP Location: Left upper arm, Patient Position: Lying)   Pulse 88   Temp 36.7 °C (98 °F) (Oral)   Resp 16   Ht 1.549 m (5' 1\")   Wt 60.3 kg (133 lb)   SpO2 96%   BMI 25.13 kg/m²     General appearance: alert, appears stated age and cooperative  HEENT: normocephalic, without obvious abnormality, atraumatic  Lungs: clear to auscultation bilaterally  Heart: regular rate and rhythm, S1, S2 normal, no murmur, click, rub or gallop  Abdomen: soft, mild/diffuse abd tenderness x 4, no rebound or guarding; bowel sounds normal; no masses, no organomegaly  Rectal: N/A  Extremities: extremities normal, warm and well-perfused; no cyanosis, clubbing, or edema  Skin: Skin color, texture, turgor normal. No rashes or lesions  Neurologic: Grossly normal     LABS & IMAGING     Labs:  I have reviewed the patient's labs.  Significant abnormals are as above..    Imaging: I have reviewed the Imaging from the last 24 hrs.  ECG 12 lead         CT ABDOMEN PELVIS WITHOUT IV CONTRAST   Final Result   IMPRESSION:   Edema and diffuse stranding surrounding the pancreas in keeping with acute   pancreatitis.  There are some areas of low attenuation within the pancreatic   body for which developing necrosis is not excluded.   Mild " fullness of the right collecting system without evidence of obstructing   calculus.           ASSESSMENT & PLAN     Abdominal pain  Assessment & Plan  See pancreatitis section for details.     Pancreatitis  Assessment & Plan  72 y/o healthy F w/ prior hx of hysterectomy / pancreatitis 5 yrs ago (idiopathic) presenting for consult for pancreatitis.  5 days of abdominal pain prior to admission.  She is currently visiting from [place] and staying with her son.  History of pancreatitis previously, suspected to be related to microlithiasis but was essentially idiopathic 5 years ago and was admitted for this.  She did not have follow-up, MRCP, further work-up for pancreatitis after this.  There are no chronic GI symptoms.  No alcohol use, new medications to suspect alternative cause.  She is appearing well and improved this morning.    -Continue fluids as ordered--once tolerating clear liquids can back off high-volume fluids  -Daily CBC and CMP  -US ABD to rule out gallstones  -Will need outpatient MRI/MRCP to delineate anatomy/rule out divisum of pancreas--this should be done after improvement/resolution of acute pancreatitis, should be performed 2-4 weeks  -Check IgG subclasses to rule out autoimmune pancreatitis  -Pain control as needed; antiemetics as needed  -Clear liquids okay for now-if she tolerates this well throughout today can advance to low residue/low fat/low fiber diet by later this afternoon  -She will need outpatient GI follow-up for work-up of pancreatitis-this is her second episode  -Check triglycerides here for completeness        PAULA Juan  6/20/2022

## 2022-06-20 NOTE — H&P
Hospital Medicine Service -  History & Physical        CHIEF COMPLAINT   Acute pancreatitis     HISTORY OF PRESENT ILLNESS      Andressa Whittington is a 71 y.o. female with a past medical history of  left upper quadrant abdominal pain, for last 5 days ago , but pain got significantly worse last night/not able to sleep. She does have a history of gallstone pancreatitis ? 5 years ago-no ERCP done at that time and gallstones were not found.  She took Tums, Maalox and Pepcid without improvement, Tylenol improved her symptoms somewhat today.  Her last bowel movement was on Friday and was normal.   She did have a poor PO intake for last 48-72 hrs, but denies nausea, vomit or diarrhea  PAST MEDICAL AND SURGICAL HISTORY      No past medical history on file.    No past surgical history on file.    PCP: Unable, To Obtain Pcp    MEDICATIONS      Prior to Admission medications    Not on File       ALLERGIES      Patient has no known allergies.    FAMILY HISTORY      No family history on file.    SOCIAL HISTORY      Social History     Socioeconomic History    Marital status: Single       REVIEW OF SYSTEMS      All other systems reviewed and negative except as noted in HPI    PHYSICAL EXAMINATION      Temp:  [36.6 °C (97.9 °F)] 36.6 °C (97.9 °F)  Heart Rate:  [78-96] 78  Resp:  [18-20] 20  BP: (161-208)/(76-92) 208/92  Body mass index is 26.26 kg/m².    Physical Exam  Constitutional:       Appearance: She is normal weight.   HENT:      Head: Normocephalic.      Mouth/Throat:      Mouth: Mucous membranes are moist.   Eyes:      Pupils: Pupils are equal, round, and reactive to light.   Cardiovascular:      Rate and Rhythm: Normal rate and regular rhythm.      Pulses: Normal pulses.   Pulmonary:      Effort: Pulmonary effort is normal.   Abdominal:      Tenderness: There is abdominal tenderness in the left upper quadrant.   Neurological:      General: No focal deficit present.      Mental Status: She is alert. Mental status is at  baseline.   Psychiatric:         Mood and Affect: Mood normal.         LABS / IMAGING / EKG        Labs   Latest Reference Range & Units 06/19/22 16:49   Sodium 136 - 144 mEQ/L 135 (L)   Potassium 3.6 - 5.1 mEQ/L 3.7   Chloride 98 - 109 mEQ/L 99   CO2 22 - 32 mEQ/L 24   BUN 8 - 20 mg/dL 18   Creatinine 0.6 - 1.1 mg/dL 1.3 (H)   Glucose 70 - 99 mg/dL 161 (H)   Calcium 8.9 - 10.3 mg/dL 9.1   AST (SGOT) 15 - 41 IU/L 19   ALT (SGPT) 11 - 54 IU/L 12   Alkaline Phosphatase 35 - 126 IU/L 59   Total Protein 6.0 - 8.2 g/dL 6.8   Albumin 3.4 - 5.0 g/dL 3.6   Bilirubin, Total 0.3 - 1.2 mg/dL 0.6   eGFR >=60.0 mL/min/1.73m*2 40.2 (L)   Anion Gap 3 - 15 mEQ/L 12   Lipase 20 - 51 U/L 242 (H)   High Sens Troponin I <15 pg/mL 12.60   WBC 3.80 - 10.50 K/uL 8.20   RBC 3.93 - 5.22 M/uL 4.10   Hemoglobin 11.8 - 15.7 g/dL 11.5 (L)   Hematocrit 35.0 - 45.0 % 36.2   MCV 83.0 - 98.0 fL 88.3   MCH 28.0 - 33.2 pg 28.0   MCHC 32.2 - 35.5 g/dL 31.8 (L)   RDW 11.7 - 14.4 % 12.9   Platelets 150 - 369 K/uL 279   MPV 9.4 - 12.3 fL 10.3   Differential Type  Auto   nRBC <=0.0 % 0.0   (L): Data is abnormally low  (H): Data is abnormally high    CLINICAL HISTORY: LUQ abdominal pain  h/o pancreatitis     COMPARISON: None available     COMMENT:     TECHNIQUE: CT of the abdomen and pelvis was performed with the patient in the  supine position. Images reconstructed/reformatted in the axial, coronal and  sagittal planes.  CT DOSE:  One or more dose reduction techniques (e.g. automated exposure  control, adjustment of the mA and/or kV according to patient size, use of  iterative reconstruction technique) utilized for this examination.  ORAL CONTRAST: None  IV contrast: None  The lack of intravenous contrast limits interpretation of the solid organs,  vessels and certain processes.     LOWER CHEST: Within normal limits.     LIVER: Within normal limits.  BILE DUCTS: Normal caliber.  GALLBLADDER: No calcified gallstones. Normal caliber wall.  PANCREAS: There  is edema and diffuse stranding surrounding the pancreas in  keeping with acute pancreatitis.  There are some areas of low attenuation within  the pancreatic body for which developing necrosis is not excluded.  SPLEEN: Within normal limits.  ADRENALS: Nodular thickening of the adrenal glands bilaterally.  KIDNEYS/URETERS/BLADDER: Mild fullness of the right collecting system without  evidence of obstructing calculus.     REPRODUCTIVE ORGANS: Unremarkable.     BOWEL: Appendix is visualized and unremarkable. Normal bowel caliber.  PERITONEUM: No ascites or free air, no fluid collection  VESSELS: Atherosclerotic disease of the normal caliber aorta.  LYMPH NODES: Prominent lymph nodes in the upper abdominal mesentery are likely  reactive.  ABDOMINAL WALL: Fat-containing ventral hernia.  BONES: Multilevel degenerative changes in the spine.     --  IMPRESSION:  Edema and diffuse stranding surrounding the pancreas in keeping with acute  pancreatitis.  There are some areas of low attenuation within the pancreatic  body for which developing necrosis is not excluded.  Mild fullness of the right collecting system without evidence of obstructing  calculus.      SARS-CoV-2 (COVID-19) (no units)   Date/Time Value   06/19/2022 1835 Negative       ECG/Telemetry      ASSESSMENT AND PLAN           Hypothyroidism  Assessment & Plan  On oral LT4 replacement therapy-100 ug, off for tonight since NPO might need IV dose-50 ug    GERD (gastroesophageal reflux disease)  Assessment & Plan  For protonix IV    JONE (acute kidney injury) (CMS/HCC)  Assessment & Plan  With baseline creatinine 1.-1.2, now up to 1.3, related to acute dehydration  For IVF  F/u BMP and UOP    Type 2 diabetes mellitus (CMS/HCC)  Assessment & Plan  As per family member, was on metformin. To place on pcot and sliding scale insulin    Hypertension  Assessment & Plan  As per family member on amlodipine and lisinopril, since NPO , to place on hydralazine IV    * Acute  pancreatitis, unspecified complication status, unspecified pancreatitis type  Assessment & Plan  As per family member with 5 days of LUQ pain getting worse, managed with tylenol, TUMS, maalox, but last night got worse  Hx of pancreatitis 5 years ago-presumed gallstone pancreatitis but no ERCP done.  Recent labs reviewed with elevated lipase and CT scan of abdomen with evidence of edema in pancreatic area  Admit to telemetry  NPO  IVF  Pain management  Protonix IV  GI evaluation in am         VTE Assessment: Pad[place] VTE Score: 4  VTE Prophylaxis: sequential compression devices  Code Status: Full Code      Discussed advanced care planning.   Estimated Discharge Date: 6/22/2022  Disposition Planning: to be determined     Janki Quiñonez MD  6/19/2022

## 2022-06-20 NOTE — ASSESSMENT & PLAN NOTE
- Patient with history of HTN.   - Pt BP elevated on admission; suspect it is 2/2 pain related to acute pancreatitis.   - Pt states takes Metoprolol succinate XL and Lisinopril for management at home.    - Maintain home medications at discharge.   - Continue statin at discharge.   - Follow up with PCP in 1 week.

## 2022-06-20 NOTE — PATIENT CARE CONFERENCE
Care Progression Rounds Note  Date: 6/20/2022  Time: 10:46 AM     Patient Name: Andressa Whittington     Medical Record Number: [id number]   YOB: 1950  Sex: Female      Room/Bed: 0355    Admitting Diagnosis: Elevated serum creatinine [R79.89]  Acute pancreatitis, unspecified complication status, unspecified pancreatitis type [K85.90]   Admit Date/Time: 6/19/2022  4:43 PM    Primary Diagnosis: Acute pancreatitis, unspecified complication status, unspecified pancreatitis type  Principal Problem: Acute pancreatitis, unspecified complication status, unspecified pancreatitis type    GMLOS: pending  Anticipated Discharge Date: 6/22/2022    AM-PAC:  Mobility Score:      Discharge Planning:  Concerns to be Addressed: no discharge needs identified  Anticipated Discharge Disposition: family member's home without services    Barriers to Discharge:  Medical issues not resolved    Comments:  acute pancreatitis, abd pain, CLD    Participants:  advanced practice provider, nursing, social work/services

## 2022-06-20 NOTE — PLAN OF CARE
Problem: Adult Inpatient Plan of Care  Goal: Plan of Care Review  Outcome: Progressing  Flowsheets (Taken 6/20/2022 0354)  Progress: no change  Plan of Care Reviewed With: patient  Outcome Summary: ED admit for acute pancreatitis. BP elevated at times-has prn hydralazine if SBP >150. Given once in ED before arrival to the floor. morphine given once for pain.  IVFs infusing.  DB=191. Still needs home meds ordered.  med rec completed with son, who stayed at bedside overnight. NPO. stone son from [place].   Plan of Care Review  Plan of Care Reviewed With: patient  Progress: no change  Outcome Summary: ED admit for acute pancreatitis. BP elevated at times-has prn hydralazine if SBP >150. Given once in ED before arrival to the floor. morphine given once for pain.  IVFs infusing.  UX=273. Still needs home meds ordered.  med rec completed with son, who stayed at bedside overnight. NPO. stone son from [place].

## 2022-06-20 NOTE — ASSESSMENT & PLAN NOTE
- Pt presents with acute pancreatitis and reports poor PO intake.   - Pt denies difficulty with urinating.   - Baseline Cr on admission is 1.3; AM labs Cr now 0.9; resolved with IV hydration and diet advancement.   - Suspect etiology of JONE related to poor PO take and acute dehydration. .  - Stable for discharge.   - Follow up with PCP in 1 week.

## 2022-06-20 NOTE — PLAN OF CARE
Plan of Care Review  Plan of Care Reviewed With: patient  Progress: no change  Outcome Summary: pt feeling pain after small amount of clear liquids. pain relieved with morphine. pt NPO until ultrasound of abdomen complete. VSS.

## 2022-06-20 NOTE — ASSESSMENT & PLAN NOTE
- Per patient's son, pt c/o LUQ abdominal pain x 5 days prior to admission.   - Hx of pancreatitis 5 years ago-presumed gallstone pancreatitis but no ERCP done; pt resides in [place] and is visiting family.   - Lipase on admission elevated (242) trending down to 98. Triglycerides normal (74)   - CT scan abd/pelvis: Edema and diffuse stranding surrounding the pancreas in keeping with acute    pancreatitis.  There are some areas of low attenuation within the pancreatic    body for which developing necrosis is not excluded.    Mild fullness of the right collecting system without evidence of obstructing   calculus.   - U/S of abd completed on 6/20/2022: Heterogeneous pancreas and liver. No evidence of  Cholelithiasis.  - Pain management: pt currently denies pain this AM and has not required use of PRN Morphine overnight.   - Patient currently denies N/V.   - Patient's diet advanced to low fiber diet today; tolerated breakfast and lunch  - Stable for discharge to home (staying with her son) and plan to follow up with GI outpatient in [place] in 1 month.

## 2022-06-20 NOTE — ASSESSMENT & PLAN NOTE
- Patient with hx of diabetes mellitus type 2- takes Metformin at home.   - Metformin held in hospital; POC blood glucose monitored.   - Restart Metformin at discharge.

## 2022-06-20 NOTE — ASSESSMENT & PLAN NOTE
70 y/o healthy F w/ prior hx of hysterectomy / pancreatitis 5 yrs ago (idiopathic) presenting for consult for pancreatitis.  5 days of abdominal pain prior to admission.  She is currently visiting from [place] and staying with her son.  History of pancreatitis previously, suspected to be related to microlithiasis but was essentially idiopathic 5 years ago and was admitted for this.  She did not have follow-up, MRCP, further work-up for pancreatitis after this.  There are no chronic GI symptoms.  No alcohol use, new medications to suspect alternative cause.  She is appearing well and improved this morning.    -Continue fluids as ordered--once tolerating clear liquids can back off high-volume fluids  -Daily CBC and CMP  -US ABD to rule out gallstones  -Will need outpatient MRI/MRCP to delineate anatomy/rule out divisum of pancreas--this should be done after improvement/resolution of acute pancreatitis, should be performed 2-4 weeks  -Check IgG subclasses to rule out autoimmune pancreatitis  -Pain control as needed; antiemetics as needed  -Clear liquids okay for now-if she tolerates this well throughout today can advance to low residue/low fat/low fiber diet by later this afternoon  -She will need outpatient GI follow-up for work-up of pancreatitis-this is her second episode  -Check triglycerides here for completeness

## 2022-06-20 NOTE — NURSING NOTE
Home medications not ordered on admission.  Med reconciliation completed with patient's son at the bedside and paged Dr. Quiñonez to make aware.  No new orders at this time.

## 2022-06-21 VITALS
HEIGHT: 61 IN | RESPIRATION RATE: 18 BRPM | SYSTOLIC BLOOD PRESSURE: 154 MMHG | DIASTOLIC BLOOD PRESSURE: 90 MMHG | HEART RATE: 68 BPM | WEIGHT: 133 LBS | TEMPERATURE: 98 F | BODY MASS INDEX: 25.11 KG/M2 | OXYGEN SATURATION: 97 %

## 2022-06-21 LAB
ANION GAP SERPL CALC-SCNC: 5 MEQ/L (ref 3–15)
BUN SERPL-MCNC: 7 MG/DL (ref 8–20)
CALCIUM SERPL-MCNC: 9.1 MG/DL (ref 8.9–10.3)
CHLORIDE SERPL-SCNC: 108 MEQ/L (ref 98–109)
CO2 SERPL-SCNC: 28 MEQ/L (ref 22–32)
CREAT SERPL-MCNC: 0.7 MG/DL (ref 0.6–1.1)
ERYTHROCYTE [DISTWIDTH] IN BLOOD BY AUTOMATED COUNT: 12.7 % (ref 11.7–14.4)
GFR SERPL CREATININE-BSD FRML MDRD: >60 ML/MIN/1.73M*2
GLUCOSE BLD-MCNC: 108 MG/DL (ref 70–99)
GLUCOSE BLD-MCNC: 75 MG/DL (ref 70–99)
GLUCOSE SERPL-MCNC: 82 MG/DL (ref 70–99)
HCT VFR BLDCO AUTO: 33 % (ref 35–45)
HGB BLD-MCNC: 10.3 G/DL (ref 11.8–15.7)
MAGNESIUM SERPL-MCNC: 1.4 MG/DL (ref 1.8–2.5)
MCH RBC QN AUTO: 28.1 PG (ref 28–33.2)
MCHC RBC AUTO-ENTMCNC: 31.2 G/DL (ref 32.2–35.5)
MCV RBC AUTO: 90.2 FL (ref 83–98)
PDW BLD AUTO: 10.5 FL (ref 9.4–12.3)
PLATELET # BLD AUTO: 239 K/UL (ref 150–369)
POCT TEST: ABNORMAL
POCT TEST: NORMAL
POTASSIUM SERPL-SCNC: 3.4 MEQ/L (ref 3.6–5.1)
RBC # BLD AUTO: 3.66 M/UL (ref 3.93–5.22)
SODIUM SERPL-SCNC: 141 MEQ/L (ref 136–144)
WBC # BLD AUTO: 6.16 K/UL (ref 3.8–10.5)

## 2022-06-21 PROCEDURE — 80048 BASIC METABOLIC PNL TOTAL CA: CPT | Performed by: FAMILY MEDICINE

## 2022-06-21 PROCEDURE — 63600000 HC DRUGS/DETAIL CODE: Performed by: PHYSICIAN ASSISTANT

## 2022-06-21 PROCEDURE — 85027 COMPLETE CBC AUTOMATED: CPT | Performed by: FAMILY MEDICINE

## 2022-06-21 PROCEDURE — 63700000 HC SELF-ADMINISTRABLE DRUG: Performed by: STUDENT IN AN ORGANIZED HEALTH CARE EDUCATION/TRAINING PROGRAM

## 2022-06-21 PROCEDURE — 36415 COLL VENOUS BLD VENIPUNCTURE: CPT | Performed by: FAMILY MEDICINE

## 2022-06-21 PROCEDURE — 99239 HOSP IP/OBS DSCHRG MGMT >30: CPT | Performed by: STUDENT IN AN ORGANIZED HEALTH CARE EDUCATION/TRAINING PROGRAM

## 2022-06-21 PROCEDURE — 63700000 HC SELF-ADMINISTRABLE DRUG

## 2022-06-21 PROCEDURE — 83735 ASSAY OF MAGNESIUM: CPT | Performed by: FAMILY MEDICINE

## 2022-06-21 PROCEDURE — 63600000 HC DRUGS/DETAIL CODE

## 2022-06-21 RX ORDER — POTASSIUM CHLORIDE 750 MG/1
20 TABLET, EXTENDED RELEASE ORAL AS NEEDED
Status: DISCONTINUED | OUTPATIENT
Start: 2022-06-21 | End: 2022-06-21 | Stop reason: HOSPADM

## 2022-06-21 RX ORDER — POTASSIUM CHLORIDE 750 MG/1
40 TABLET, EXTENDED RELEASE ORAL AS NEEDED
Status: DISCONTINUED | OUTPATIENT
Start: 2022-06-21 | End: 2022-06-21 | Stop reason: HOSPADM

## 2022-06-21 RX ADMIN — METOPROLOL SUCCINATE 25 MG: 25 TABLET, FILM COATED, EXTENDED RELEASE ORAL at 08:50

## 2022-06-21 RX ADMIN — LEVOTHYROXINE SODIUM 88 MCG: 88 TABLET ORAL at 05:34

## 2022-06-21 RX ADMIN — POTASSIUM CHLORIDE 40 MEQ: 750 TABLET, EXTENDED RELEASE ORAL at 08:46

## 2022-06-21 RX ADMIN — SODIUM CHLORIDE, POTASSIUM CHLORIDE, SODIUM LACTATE AND CALCIUM CHLORIDE: 600; 310; 30; 20 INJECTION, SOLUTION INTRAVENOUS at 05:32

## 2022-06-21 RX ADMIN — SODIUM CHLORIDE, POTASSIUM CHLORIDE, SODIUM LACTATE AND CALCIUM CHLORIDE: 600; 310; 30; 20 INJECTION, SOLUTION INTRAVENOUS at 02:39

## 2022-06-21 RX ADMIN — LISINOPRIL 20 MG: 20 TABLET ORAL at 08:50

## 2022-06-21 RX ADMIN — ATORVASTATIN CALCIUM 10 MG: 10 TABLET, FILM COATED ORAL at 08:50

## 2022-06-21 RX ADMIN — MAGNESIUM SULFATE HEPTAHYDRATE 2 G: 40 INJECTION, SOLUTION INTRAVENOUS at 08:46

## 2022-06-21 NOTE — PATIENT CARE CONFERENCE
Care Progression Rounds Note  Date: 6/21/2022  Time: 10:48 AM     Patient Name: Andressa hWittington     Medical Record Number: [id number]   YOB: 1950  Sex: Female      Room/Bed: 0355    Admitting Diagnosis: Elevated serum creatinine [R79.89]  Acute pancreatitis, unspecified complication status, unspecified pancreatitis type [K85.90]   Admit Date/Time: 6/19/2022  4:43 PM    Primary Diagnosis: Acute pancreatitis, unspecified complication status, unspecified pancreatitis type  Principal Problem: Acute pancreatitis, unspecified complication status, unspecified pancreatitis type    GMLOS: 3.1  Anticipated Discharge Date: 6/21/2022    AM-PAC:  Mobility Score:      Discharge Planning:  Concerns to be Addressed: no discharge needs identified  Anticipated Discharge Disposition: family member's home without services    Barriers to Discharge:  Medical issues not resolved    Comments:  LFD, pain improved, ctm    Participants:  advanced practice provider, nursing, social work/services

## 2022-06-21 NOTE — DISCHARGE INSTRUCTIONS
Please follow up with Gastroenterology in 4 weeks for management and arrangement for an MRCP to further evaluate the pancreas.

## 2022-06-21 NOTE — DISCHARGE SUMMARY
Hospital Medicine Service -  Inpatient Discharge Summary        BRIEF OVERVIEW   Admitting Provider: Janki Quiñonez MD  Attending Provider: Pino Polanco DO Attending phys phone: [phone]    PCP: Unable, To Obtain Pcp None    Admission Date: 6/19/2022  Discharge Date: 6/21/2022     DISCHARGE DIAGNOSES      Primary Discharge Diagnosis  Acute pancreatitis, unspecified complication status, unspecified pancreatitis type    Secondary Discharge Diagnoses  Active Hospital Problems    Diagnosis Date Noted    Acute pancreatitis, unspecified complication status, unspecified pancreatitis type 06/19/2022     Priority: High    Hypertension 06/19/2022     Priority: Medium    Type 2 diabetes mellitus (CMS/HCC) 06/19/2022     Priority: Medium    JONE (acute kidney injury) (CMS/[facility]) 06/19/2022     Priority: Medium    GERD (gastroesophageal reflux disease) 06/19/2022     Priority: Medium    Hypothyroidism 06/19/2022     Priority: Medium    Pancreatitis 06/20/2022    Abdominal pain 06/20/2022      Resolved Hospital Problems   No resolved problems to display.       Problem List on Day of Discharge  * Acute pancreatitis, unspecified complication status, unspecified pancreatitis type  Assessment & Plan  - Per patient's son, pt c/o LUQ abdominal pain x 5 days prior to admission.   - Hx of pancreatitis 5 years ago-presumed gallstone pancreatitis but no ERCP done; pt resides in [place] and is visiting family.   - Lipase on admission elevated (242) trending down to 98. Triglycerides normal (74)   - CT scan abd/pelvis: Edema and diffuse stranding surrounding the pancreas in keeping with acute    pancreatitis.  There are some areas of low attenuation within the pancreatic    body for which developing necrosis is not excluded.    Mild fullness of the right collecting system without evidence of obstructing   calculus.   - U/S of abd completed on 6/20/2022: Heterogeneous pancreas and liver. No evidence  of  Cholelithiasis.  - Pain management: pt currently denies pain this AM and has not required use of PRN Morphine overnight.   - Patient currently denies N/V.   - Patient's diet advanced to low fiber diet today; tolerated breakfast and lunch  - Stable for discharge to home (staying with her son) and plan to follow up with GI outpatient in [place] in 1 month.         Hypothyroidism  Assessment & Plan  - Pt with hx of hypothyroidism; takes levothyroxine at home.   - Continue home medications.     GERD (gastroesophageal reflux disease)  Assessment & Plan  - Pt with history of GERD; denies any recent symptoms- pt takes Prilosec at home daily.   - Resume home regimen with Prilosec at discharge.     JONE (acute kidney injury) (CMS/[facility])  Assessment & Plan  - Pt presents with acute pancreatitis and reports poor PO intake.   - Pt denies difficulty with urinating.   - Baseline Cr on admission is 1.3; AM labs Cr now 0.9; resolved with IV hydration and diet advancement.   - Suspect etiology of JONE related to poor PO take and acute dehydration. .  - Stable for discharge.   - Follow up with PCP in 1 week.       Type 2 diabetes mellitus (CMS/[facility])  Assessment & Plan  - Patient with hx of diabetes mellitus type 2- takes Metformin at home.   - Metformin held in hospital; POC blood glucose monitored.   - Restart Metformin at discharge.     Hypertension  Assessment & Plan  - Patient with history of HTN.   - Pt BP elevated on admission; suspect it is 2/2 pain related to acute pancreatitis.   - Pt states takes Metoprolol succinate XL and Lisinopril for management at home.    - Maintain home medications at discharge.   - Continue statin at discharge.   - Follow up with PCP in 1 week.     SUMMARY OF HOSPITALIZATION      Presenting Problem/History of Present Illness  This is a 71 y.o. year-old female admitted on 6/19/2022 with Elevated serum creatinine [R79.89]  Acute pancreatitis, unspecified complication status, unspecified pancreatitis type  [K85.90]. Patient presented to ED after complaints of LUQ abdominal pain over 5 days. Patient attempted to relieve the pain at home with PRN Tylenol, Tums, Maalox and Pepcid, however the pain progressively got worse which prompted her arrival to the ED. To note, patient lives in [place] and is staying with her son in [place]. Patient reports has had prior episode of abdominal pain 5 years ago in which she states she has a history of presumed gallstone pancreatitis. Patient denies N/V or diarrhea, but reports poor appetite over the last 5 days prior to admission.       Hospital Course    Patient was admitted to the hospital after having LUQ abdominal pain x 5 days and an elevated Lipase (242) on admission. CT scan of abd/pelvis indicated: Edema and diffuse stranding surrounding the pancreas in keeping with acute pancreatitis. There are some areas of low attenuation within the pancreatic body for which developing necrosis is not excluded. Mild fullness of the right collecting system without evidence of obstructing Calculus. Patient was seen and evaluated by GI and obtained an abdominal ultrasound. The U/S was completed and resulted as: Heterogeneous pancreas and liver. No evidence of cholelithiasis.     Patient was treated with IV hydration and diet was advanced to low fiber. On the day of discharge, patient was tolerating diet and cleared for discharge with plans to follow up with GI outpatient in 1 month in [place], [place]. Patient signed medical records release form to have records sent to PCP for follow up.     Patient daughter in law at bedside and updated on plan of care and discharge instructions; updated patient's son.     Exam on Day of Discharge  Physical Exam  Vitals and nursing note reviewed.   Constitutional:       General: She is not in acute distress.     Appearance: Normal appearance. She is not ill-appearing.   HENT:      Head: Normocephalic and atraumatic.   Eyes:      Extraocular Movements: Extraocular  movements intact.      Pupils: Pupils are equal, round, and reactive to light.   Cardiovascular:      Rate and Rhythm: Normal rate and regular rhythm.      Pulses: Normal pulses.      Heart sounds: Normal heart sounds. No murmur heard.  Pulmonary:      Effort: Pulmonary effort is normal.      Breath sounds: Normal breath sounds.   Abdominal:      General: Bowel sounds are normal. There is no distension.      Palpations: Abdomen is soft.      Tenderness: There is no abdominal tenderness.   Musculoskeletal:         General: Normal range of motion.   Neurological:      General: No focal deficit present.      Mental Status: She is alert and oriented to person, place, and time.   Psychiatric:         Mood and Affect: Mood normal.         Consults During Admission  IP CONSULT TO GASTROENTEROLOGY    DISCHARGE MEDICATIONS          Medication List      CONTINUE taking these medications    gabapentin 300 mg capsule  Commonly known as: NEURONTIN  Take 300 mg by mouth nightly.  Dose: 300 mg     levothyroxine 88 mcg tablet  Commonly known as: SYNTHROID  Take 88 mcg by mouth daily.  Dose: 88 mcg     lisinopriL 20 mg tablet  Commonly known as: PRINIVIL  Take 20 mg by mouth daily.  Dose: 20 mg     metFORMIN 500 mg tablet  Commonly known as: GLUCOPHAGE  Take 500 mg by mouth 2 (two) times a day with meals.  Dose: 500 mg     metoprolol succinate XL 25 mg 24 hr tablet  Commonly known as: TOPROL-XL  Take 25 mg by mouth daily.  Dose: 25 mg     omeprazole 20 mg capsule  Commonly known as: PriLOSEC  Take 20 mg by mouth daily before breakfast.  Dose: 20 mg     simvastatin 20 mg tablet  Commonly known as: ZOCOR  Take 20 mg by mouth nightly.  Dose: 20 mg             Instructions for after discharge     Call provider for:  difficulty breathing, headache or visual disturbances      Call provider for:  extreme fatigue      Call provider for:  persistent dizziness or light-headedness      Call provider for:  persistent nausea or vomiting       Call provider for:  rash      Call provider for:  redness, tenderness, or signs of infection (pain, swelling, redness, odor or green/yellow discharge around incision site)      Call provider for:  severe uncontrolled pain      Call provider for:  temperature >100.4      Discharge diet      Low fat and Low Fiber    Diet Type / Texture: Regular    Follow-up with primary physician (PCP)      Follow up with PCP in 1 week; call to make an appointment.    Follow-up with provider      Follow up with Gastroenterologist when you are back in [place], [place] in 4 weeks.    Post-Discharge Activity: Normal activity as tolerated.      Normal activity as tolerated.             PROCEDURES / LABS / IMAGING      Operative Procedures  None     Other Procedures  None     Pertinent Labs     Results from last 7 days   Lab Units 06/21/22  0446   MAGNESIUM mg/dL 1.4*     Results from last 7 days   Lab Units 06/21/22  0446 06/20/22  0346 06/19/22  1649   SODIUM mEQ/L 141 141 135*   POTASSIUM mEQ/L 3.4* 3.9 3.7   CHLORIDE mEQ/L 108 105 99   CO2 mEQ/L 28 29 24   BUN mg/dL 7* 12 18   CREATININE mg/dL 0.7 1.1 1.3*   CALCIUM mg/dL 9.1 8.8* 9.1   ALBUMIN g/dL  --  3.0* 3.6   BILIRUBIN TOTAL mg/dL  --  0.6 0.6   ALK PHOS IU/L  --  52 59   ALT IU/L  --  11 12   AST IU/L  --  16 19   GLUCOSE mg/dL 82 132* 161*     Results from last 7 days   Lab Units 06/21/22  0446 06/19/22  1649   WBC K/uL 6.16 8.20   HEMOGLOBIN g/dL 10.3* 11.5*   HEMATOCRIT % 33.0* 36.2   PLATELETS K/uL 239 279   DIFF TYPE   --  Auto   NRBC %  --  0.0   IMM GRANULOCYTES %  --  0.2   NEUTROPHILS %  --  71.5   LYMPHOCYTES %  --  16.7   MONOCYTES %  --  7.3   EOSINOPHILS %  --  4.1   BASOPHILS %  --  0.2   IMM GRANUCOCYTES ABS K/uL  --  0.02   MONO ABS AUTO K/uL  --  0.60   EOS ABS AUTO K/uL  --  0.34   BASO ABS AUTO K/uL  --  0.02       SARS-CoV-2 (COVID-19) (no units)   Date/Time Value   06/19/2022 1835 Negative       Pertinent Imaging  CT ABDOMEN PELVIS WITHOUT IV CONTRAST    Result  Date: 6/19/2022  IMPRESSION: Edema and diffuse stranding surrounding the pancreas in keeping with acute pancreatitis.  There are some areas of low attenuation within the pancreatic body for which developing necrosis is not excluded. Mild fullness of the right collecting system without evidence of obstructing calculus.    ULTRASOUND ABDOMEN LIMITED    Result Date: 6/20/2022  IMPRESSION: Limited study. Heterogeneous pancreas and liver. No evidence of cholelithiasis. COMMENT: Ultrasound evaluation of the right upper quadrant was performed and compared to a CT study dated 7/19/2022. The study is limited due to overlying bowel gas. The head and body of the pancreas are identified. It has a heterogeneous echotexture consistent with CT diagnosis of pancreatitis. No discrete mass or fluid collection is seen. The tail is obscured. The visualized aorta and inferior vena cava are unremarkable. The liver is slightly heterogeneous in echotexture and measures 16.7 cm in length. There is no intrahepatic ductal dilatation or mass lesion. The common bile duct measures 3.6 mm at the laisha hepatis. The gallbladder is well-visualized. There is no cholelithiasis. The gallbladder wall is 1.3 mm in thickness. Sonographic Machado's sign could not be elicited. There is no pericholecystic fluid. The right kidney is 8.2 cm in length. There is no nephrolithiasis, hydronephrosis or mass lesion. The renal cortex is of normal thickness and slightly increased echotexture.      OUTPATIENT  FOLLOW-UP / REFERRALS / PENDING TESTS        Outpatient Follow-Up Appointments  Encounter Information    This patient does not currently have any appointments scheduled.         Referrals  No orders of the defined types were placed in this encounter.      Test Results Pending at Discharge  Unresulted Labs (From admission, onward)             Start     Ordered    06/20/22 1001  IgG Subclasses Panel  STAT        Question:  Release to patient  Answer:  Immediate     06/20/22 1000                Important Issues to Address in Follow-Up  - Follow up with GI in [place], [place] in 1 month.   - Follow up with PCP in 1 week     DISCHARGE DISPOSITION AND DESTINATION      Disposition: Home   Destination:  Home                             Code Status At Discharge: Full Code    Physician Order for Life-Sustaining Treatment Document Status      No documents found                  Detail Level: Detailed

## 2022-06-21 NOTE — PLAN OF CARE
Problem: Adult Inpatient Plan of Care  Goal: Plan of Care Review  Flowsheets (Taken 6/21/2022 1412)  Progress: improving  Plan of Care Reviewed With:   patient   daughter  Outcome Summary: Per chart review, pt is medically cleared for dc home today. SW met with pt and daughter at bedside who deny needs. IMM done. SW to remain available for any additional dc needs.

## 2022-06-21 NOTE — PATIENT CARE CONFERENCE
Care Progression Rounds Note  Date: 6/21/2022  Time: 1:47 PM     Patient Name: Andressa Whittington     Medical Record Number: [id number]   YOB: 1950  Sex: Female      Room/Bed: 0355    Admitting Diagnosis: Elevated serum creatinine [R79.89]  Acute pancreatitis, unspecified complication status, unspecified pancreatitis type [K85.90]   Admit Date/Time: 6/19/2022  4:43 PM    Primary Diagnosis: Acute pancreatitis, unspecified complication status, unspecified pancreatitis type  Principal Problem: Acute pancreatitis, unspecified complication status, unspecified pancreatitis type    GMLOS: 3.1  Anticipated Discharge Date: 6/21/2022    AM-PAC:  Mobility Score:      Discharge Planning:  Concerns to be Addressed: no discharge needs identified  Anticipated Discharge Disposition: family member's home without services    Barriers to Discharge:  None    Comments:  dc home today, tolerating diet    Participants:  social work/services

## 2022-06-21 NOTE — PLAN OF CARE
Plan of Care Review  Plan of Care Reviewed With: patient  Progress: improving  Outcome Summary: Pt with no c/o pain this shift. LR continued at 200ml/hr per gastro rec. Supervision to the bathroom. PRN hydralazine given for systolic BP>150.

## 2022-06-21 NOTE — NURSING NOTE
Patient discharged at 2:22 pm to home with daughter.  IV and tele monitor discontinued before patient left.  All discharge instructions explained to patient and all questions answered .  All belongings sent home with patient.

## 2022-06-23 LAB
IGG SERPL-MCNC: 840 MG/DL (ref 600–1540)
IGG1 SER-MCNC: 471 MG/DL (ref 382–929)
IGG2 SER-MCNC: 272 MG/DL (ref 241–700)
IGG3 SER-MCNC: 33 MG/DL (ref 22–178)
IGG4 SER-MCNC: 16.7 MG/DL (ref 4–86)

## 2022-06-25 ENCOUNTER — HOSPITAL ENCOUNTER (INPATIENT)
Facility: HOSPITAL | Age: 72
LOS: 1 days | Discharge: HOME | DRG: 439 | End: 2022-06-27
Attending: EMERGENCY MEDICINE | Admitting: HOSPITALIST
Payer: COMMERCIAL

## 2022-06-25 DIAGNOSIS — K86.2 PANCREATIC CYST: ICD-10-CM

## 2022-06-25 DIAGNOSIS — K85.90 ACUTE PANCREATITIS, UNSPECIFIED COMPLICATION STATUS, UNSPECIFIED PANCREATITIS TYPE: Primary | ICD-10-CM

## 2022-06-25 LAB
BASOPHILS # BLD: 0.03 K/UL (ref 0.01–0.1)
BASOPHILS NFR BLD: 0.4 %
DIFFERENTIAL METHOD BLD: ABNORMAL
EOSINOPHIL # BLD: 0.29 K/UL (ref 0.04–0.36)
EOSINOPHIL NFR BLD: 3.9 %
ERYTHROCYTE [DISTWIDTH] IN BLOOD BY AUTOMATED COUNT: 12.1 % (ref 11.7–14.4)
HCT VFR BLDCO AUTO: 35 % (ref 35–45)
HGB BLD-MCNC: 11.3 G/DL (ref 11.8–15.7)
IMM GRANULOCYTES # BLD AUTO: 0.03 K/UL (ref 0–0.08)
IMM GRANULOCYTES NFR BLD AUTO: 0.4 %
LYMPHOCYTES # BLD: 1.87 K/UL (ref 1.2–3.5)
LYMPHOCYTES NFR BLD: 25.3 %
MCH RBC QN AUTO: 27.6 PG (ref 28–33.2)
MCHC RBC AUTO-ENTMCNC: 32.3 G/DL (ref 32.2–35.5)
MCV RBC AUTO: 85.4 FL (ref 83–98)
MONOCYTES # BLD: 0.48 K/UL (ref 0.28–0.8)
MONOCYTES NFR BLD: 6.5 %
NEUTROPHILS # BLD: 4.68 K/UL (ref 1.7–7)
NEUTS SEG NFR BLD: 63.5 %
NRBC BLD-RTO: 0 %
PDW BLD AUTO: 9.8 FL (ref 9.4–12.3)
PLATELET # BLD AUTO: 305 K/UL (ref 150–369)
RBC # BLD AUTO: 4.1 M/UL (ref 3.93–5.22)
WBC # BLD AUTO: 7.38 K/UL (ref 3.8–10.5)

## 2022-06-25 PROCEDURE — 83690 ASSAY OF LIPASE: CPT | Performed by: EMERGENCY MEDICINE

## 2022-06-25 PROCEDURE — 96375 TX/PRO/DX INJ NEW DRUG ADDON: CPT | Mod: 59

## 2022-06-25 PROCEDURE — 80076 HEPATIC FUNCTION PANEL: CPT | Performed by: EMERGENCY MEDICINE

## 2022-06-25 PROCEDURE — 80053 COMPREHEN METABOLIC PANEL: CPT | Performed by: EMERGENCY MEDICINE

## 2022-06-25 PROCEDURE — 25800000 HC PHARMACY IV SOLUTIONS: Performed by: EMERGENCY MEDICINE

## 2022-06-25 PROCEDURE — 96361 HYDRATE IV INFUSION ADD-ON: CPT | Mod: 59

## 2022-06-25 PROCEDURE — 96374 THER/PROPH/DIAG INJ IV PUSH: CPT | Mod: 59

## 2022-06-25 PROCEDURE — 85025 COMPLETE CBC W/AUTO DIFF WBC: CPT | Performed by: EMERGENCY MEDICINE

## 2022-06-25 PROCEDURE — 36415 COLL VENOUS BLD VENIPUNCTURE: CPT | Performed by: EMERGENCY MEDICINE

## 2022-06-25 PROCEDURE — 99285 EMERGENCY DEPT VISIT HI MDM: CPT | Mod: 25

## 2022-06-25 RX ORDER — SODIUM CHLORIDE 9 MG/ML
INJECTION, SOLUTION INTRAVENOUS CONTINUOUS
Status: DISCONTINUED | OUTPATIENT
Start: 2022-06-26 | End: 2022-06-26

## 2022-06-25 RX ADMIN — SODIUM CHLORIDE: 0.9 INJECTION, SOLUTION INTRAVENOUS at 23:59

## 2022-06-25 RX ADMIN — SODIUM CHLORIDE 500 ML: 9 INJECTION, SOLUTION INTRAVENOUS at 23:59

## 2022-06-26 ENCOUNTER — APPOINTMENT (EMERGENCY)
Dept: RADIOLOGY | Facility: HOSPITAL | Age: 72
DRG: 439 | End: 2022-06-26
Attending: EMERGENCY MEDICINE
Payer: COMMERCIAL

## 2022-06-26 LAB
ALBUMIN SERPL-MCNC: 3.7 G/DL (ref 3.4–5)
ALP SERPL-CCNC: 61 IU/L (ref 35–126)
ALT SERPL-CCNC: 15 IU/L (ref 11–54)
ANION GAP SERPL CALC-SCNC: 10 MEQ/L (ref 3–15)
ANION GAP SERPL CALC-SCNC: 11 MEQ/L (ref 3–15)
AST SERPL-CCNC: 22 IU/L (ref 15–41)
BASOPHILS # BLD: 0.03 K/UL (ref 0.01–0.1)
BASOPHILS NFR BLD: 0.5 %
BILIRUB DIRECT SERPL-MCNC: 0.2 MG/DL
BILIRUB SERPL-MCNC: 0.7 MG/DL (ref 0.3–1.2)
BUN SERPL-MCNC: 17 MG/DL (ref 8–20)
BUN SERPL-MCNC: 8 MG/DL (ref 8–20)
CALCIUM SERPL-MCNC: 9.2 MG/DL (ref 8.9–10.3)
CALCIUM SERPL-MCNC: 9.2 MG/DL (ref 8.9–10.3)
CHLORIDE SERPL-SCNC: 102 MEQ/L (ref 98–109)
CHLORIDE SERPL-SCNC: 95 MEQ/L (ref 98–109)
CO2 SERPL-SCNC: 27 MEQ/L (ref 22–32)
CO2 SERPL-SCNC: 29 MEQ/L (ref 22–32)
CREAT SERPL-MCNC: 1.1 MG/DL (ref 0.6–1.1)
CREAT SERPL-MCNC: 1.4 MG/DL (ref 0.6–1.1)
DIFFERENTIAL METHOD BLD: ABNORMAL
EOSINOPHIL # BLD: 0.14 K/UL (ref 0.04–0.36)
EOSINOPHIL NFR BLD: 2.2 %
ERYTHROCYTE [DISTWIDTH] IN BLOOD BY AUTOMATED COUNT: 12.3 % (ref 11.7–14.4)
GFR SERPL CREATININE-BSD FRML MDRD: 36.9 ML/MIN/1.73M*2
GFR SERPL CREATININE-BSD FRML MDRD: 48.7 ML/MIN/1.73M*2
GLUCOSE BLD-MCNC: 105 MG/DL (ref 70–99)
GLUCOSE BLD-MCNC: 126 MG/DL (ref 70–99)
GLUCOSE BLD-MCNC: 127 MG/DL (ref 70–99)
GLUCOSE BLD-MCNC: 133 MG/DL (ref 70–99)
GLUCOSE SERPL-MCNC: 112 MG/DL (ref 70–99)
GLUCOSE SERPL-MCNC: 121 MG/DL (ref 70–99)
HCT VFR BLDCO AUTO: 35.1 % (ref 35–45)
HGB BLD-MCNC: 11.3 G/DL (ref 11.8–15.7)
IMM GRANULOCYTES # BLD AUTO: 0.02 K/UL (ref 0–0.08)
IMM GRANULOCYTES NFR BLD AUTO: 0.3 %
LIPASE SERPL-CCNC: 164 U/L (ref 20–51)
LYMPHOCYTES # BLD: 1.31 K/UL (ref 1.2–3.5)
LYMPHOCYTES NFR BLD: 20.7 %
MCH RBC QN AUTO: 27.9 PG (ref 28–33.2)
MCHC RBC AUTO-ENTMCNC: 32.2 G/DL (ref 32.2–35.5)
MCV RBC AUTO: 86.7 FL (ref 83–98)
MONOCYTES # BLD: 0.57 K/UL (ref 0.28–0.8)
MONOCYTES NFR BLD: 9 %
NEUTROPHILS # BLD: 4.25 K/UL (ref 1.7–7)
NEUTS SEG NFR BLD: 67.3 %
NRBC BLD-RTO: 0 %
PDW BLD AUTO: 10 FL (ref 9.4–12.3)
PLATELET # BLD AUTO: 283 K/UL (ref 150–369)
POCT TEST: ABNORMAL
POTASSIUM SERPL-SCNC: 3.1 MEQ/L (ref 3.6–5.1)
POTASSIUM SERPL-SCNC: 4 MEQ/L (ref 3.6–5.1)
PROT SERPL-MCNC: 7.2 G/DL (ref 6–8.2)
RBC # BLD AUTO: 4.05 M/UL (ref 3.93–5.22)
SARS-COV-2 RNA RESP QL NAA+PROBE: NEGATIVE
SODIUM SERPL-SCNC: 133 MEQ/L (ref 136–144)
SODIUM SERPL-SCNC: 141 MEQ/L (ref 136–144)
WBC # BLD AUTO: 6.32 K/UL (ref 3.8–10.5)

## 2022-06-26 PROCEDURE — 80048 BASIC METABOLIC PNL TOTAL CA: CPT | Performed by: HOSPITALIST

## 2022-06-26 PROCEDURE — 25000000 HC PHARMACY GENERAL: Performed by: STUDENT IN AN ORGANIZED HEALTH CARE EDUCATION/TRAINING PROGRAM

## 2022-06-26 PROCEDURE — 25800000 HC PHARMACY IV SOLUTIONS: Performed by: HOSPITALIST

## 2022-06-26 PROCEDURE — 63600105 HC IODINE BASED CONTRAST: Performed by: EMERGENCY MEDICINE

## 2022-06-26 PROCEDURE — G1004 CDSM NDSC: HCPCS

## 2022-06-26 PROCEDURE — 85025 COMPLETE CBC W/AUTO DIFF WBC: CPT | Performed by: HOSPITALIST

## 2022-06-26 PROCEDURE — 63600000 HC DRUGS/DETAIL CODE: Performed by: HOSPITALIST

## 2022-06-26 PROCEDURE — U0002 COVID-19 LAB TEST NON-CDC: HCPCS | Performed by: EMERGENCY MEDICINE

## 2022-06-26 PROCEDURE — 99222 1ST HOSP IP/OBS MODERATE 55: CPT | Performed by: HOSPITALIST

## 2022-06-26 PROCEDURE — 63600000 HC DRUGS/DETAIL CODE: Performed by: EMERGENCY MEDICINE

## 2022-06-26 PROCEDURE — 36415 COLL VENOUS BLD VENIPUNCTURE: CPT | Performed by: HOSPITALIST

## 2022-06-26 PROCEDURE — 12000000 HC ROOM AND CARE MED/SURG

## 2022-06-26 PROCEDURE — 63700000 HC SELF-ADMINISTRABLE DRUG: Performed by: HOSPITALIST

## 2022-06-26 RX ORDER — POLYETHYLENE GLYCOL 3350 17 G/17G
17 POWDER, FOR SOLUTION ORAL DAILY
Status: DISCONTINUED | OUTPATIENT
Start: 2022-06-26 | End: 2022-06-27 | Stop reason: HOSPADM

## 2022-06-26 RX ORDER — SODIUM CHLORIDE, SODIUM LACTATE, POTASSIUM CHLORIDE, CALCIUM CHLORIDE 600; 310; 30; 20 MG/100ML; MG/100ML; MG/100ML; MG/100ML
INJECTION, SOLUTION INTRAVENOUS CONTINUOUS
Status: DISCONTINUED | OUTPATIENT
Start: 2022-06-26 | End: 2022-06-27 | Stop reason: HOSPADM

## 2022-06-26 RX ORDER — DEXTROSE 40 %
15-30 GEL (GRAM) ORAL AS NEEDED
Status: DISCONTINUED | OUTPATIENT
Start: 2022-06-26 | End: 2022-06-27 | Stop reason: HOSPADM

## 2022-06-26 RX ORDER — METOPROLOL SUCCINATE 25 MG/1
25 TABLET, EXTENDED RELEASE ORAL DAILY
Status: DISCONTINUED | OUTPATIENT
Start: 2022-06-26 | End: 2022-06-27 | Stop reason: HOSPADM

## 2022-06-26 RX ORDER — PROCHLORPERAZINE EDISYLATE 5 MG/ML
2.5 INJECTION INTRAMUSCULAR; INTRAVENOUS EVERY 6 HOURS PRN
Status: DISCONTINUED | OUTPATIENT
Start: 2022-06-26 | End: 2022-06-27 | Stop reason: HOSPADM

## 2022-06-26 RX ORDER — ATORVASTATIN CALCIUM 10 MG/1
10 TABLET, FILM COATED ORAL DAILY
Status: DISCONTINUED | OUTPATIENT
Start: 2022-06-26 | End: 2022-06-27 | Stop reason: HOSPADM

## 2022-06-26 RX ORDER — DEXTROSE 50 % IN WATER (D50W) INTRAVENOUS SYRINGE
25 AS NEEDED
Status: DISCONTINUED | OUTPATIENT
Start: 2022-06-26 | End: 2022-06-27 | Stop reason: HOSPADM

## 2022-06-26 RX ORDER — MORPHINE SULFATE 4 MG/ML
4 INJECTION, SOLUTION INTRAMUSCULAR; INTRAVENOUS ONCE
Status: COMPLETED | OUTPATIENT
Start: 2022-06-26 | End: 2022-06-26

## 2022-06-26 RX ORDER — GABAPENTIN 300 MG/1
300 CAPSULE ORAL NIGHTLY
Status: DISCONTINUED | OUTPATIENT
Start: 2022-06-26 | End: 2022-06-27 | Stop reason: HOSPADM

## 2022-06-26 RX ORDER — TRAMADOL HYDROCHLORIDE 50 MG/1
50 TABLET ORAL EVERY 6 HOURS PRN
Status: DISCONTINUED | OUTPATIENT
Start: 2022-06-26 | End: 2022-06-27 | Stop reason: HOSPADM

## 2022-06-26 RX ORDER — HYDRALAZINE HYDROCHLORIDE 20 MG/ML
10 INJECTION INTRAMUSCULAR; INTRAVENOUS EVERY 6 HOURS PRN
Status: DISCONTINUED | OUTPATIENT
Start: 2022-06-26 | End: 2022-06-26 | Stop reason: SDUPTHER

## 2022-06-26 RX ORDER — PANTOPRAZOLE SODIUM 40 MG/10ML
40 INJECTION, POWDER, LYOPHILIZED, FOR SOLUTION INTRAVENOUS 2 TIMES DAILY
Status: DISCONTINUED | OUTPATIENT
Start: 2022-06-26 | End: 2022-06-27 | Stop reason: HOSPADM

## 2022-06-26 RX ORDER — ONDANSETRON HYDROCHLORIDE 2 MG/ML
4 INJECTION, SOLUTION INTRAVENOUS ONCE
Status: COMPLETED | OUTPATIENT
Start: 2022-06-26 | End: 2022-06-26

## 2022-06-26 RX ORDER — IODIXANOL 320 MG/ML
75 INJECTION, SOLUTION INTRAVASCULAR ONCE
Status: COMPLETED | OUTPATIENT
Start: 2022-06-26 | End: 2022-06-26

## 2022-06-26 RX ORDER — POTASSIUM CHLORIDE 14.9 MG/ML
20 INJECTION INTRAVENOUS ONCE
Status: COMPLETED | OUTPATIENT
Start: 2022-06-26 | End: 2022-06-27

## 2022-06-26 RX ORDER — IBUPROFEN 200 MG
16-32 TABLET ORAL AS NEEDED
Status: DISCONTINUED | OUTPATIENT
Start: 2022-06-26 | End: 2022-06-27 | Stop reason: HOSPADM

## 2022-06-26 RX ORDER — HYDROMORPHONE HYDROCHLORIDE 1 MG/ML
0.25 INJECTION, SOLUTION INTRAMUSCULAR; INTRAVENOUS; SUBCUTANEOUS
Status: DISCONTINUED | OUTPATIENT
Start: 2022-06-26 | End: 2022-06-26

## 2022-06-26 RX ORDER — METFORMIN HYDROCHLORIDE 500 MG/1
500 TABLET ORAL 2 TIMES DAILY WITH MEALS
Status: DISCONTINUED | OUTPATIENT
Start: 2022-06-26 | End: 2022-06-27 | Stop reason: HOSPADM

## 2022-06-26 RX ORDER — ONDANSETRON HYDROCHLORIDE 2 MG/ML
4 INJECTION, SOLUTION INTRAVENOUS EVERY 6 HOURS PRN
Status: DISCONTINUED | OUTPATIENT
Start: 2022-06-26 | End: 2022-06-27 | Stop reason: HOSPADM

## 2022-06-26 RX ORDER — LISINOPRIL 20 MG/1
20 TABLET ORAL DAILY
Status: DISCONTINUED | OUTPATIENT
Start: 2022-06-26 | End: 2022-06-27 | Stop reason: HOSPADM

## 2022-06-26 RX ORDER — LEVOTHYROXINE SODIUM 88 UG/1
88 TABLET ORAL
Status: DISCONTINUED | OUTPATIENT
Start: 2022-06-26 | End: 2022-06-27 | Stop reason: HOSPADM

## 2022-06-26 RX ORDER — INSULIN ASPART 100 [IU]/ML
3-5 INJECTION, SOLUTION INTRAVENOUS; SUBCUTANEOUS
Status: DISCONTINUED | OUTPATIENT
Start: 2022-06-26 | End: 2022-06-27 | Stop reason: HOSPADM

## 2022-06-26 RX ADMIN — HYDRALAZINE HYDROCHLORIDE 10 MG: 20 INJECTION INTRAMUSCULAR; INTRAVENOUS at 17:23

## 2022-06-26 RX ADMIN — PANTOPRAZOLE SODIUM 40 MG: 40 INJECTION, POWDER, LYOPHILIZED, FOR SOLUTION INTRAVENOUS at 19:53

## 2022-06-26 RX ADMIN — SODIUM CHLORIDE, POTASSIUM CHLORIDE, SODIUM LACTATE AND CALCIUM CHLORIDE: 600; 310; 30; 20 INJECTION, SOLUTION INTRAVENOUS at 21:15

## 2022-06-26 RX ADMIN — SODIUM CHLORIDE, POTASSIUM CHLORIDE, SODIUM LACTATE AND CALCIUM CHLORIDE: 600; 310; 30; 20 INJECTION, SOLUTION INTRAVENOUS at 01:36

## 2022-06-26 RX ADMIN — IODIXANOL 75 ML: 320 INJECTION, SOLUTION INTRAVASCULAR at 00:45

## 2022-06-26 RX ADMIN — ATORVASTATIN CALCIUM 10 MG: 10 TABLET, FILM COATED ORAL at 10:38

## 2022-06-26 RX ADMIN — MORPHINE SULFATE 4 MG: 4 INJECTION, SOLUTION INTRAMUSCULAR; INTRAVENOUS at 00:27

## 2022-06-26 RX ADMIN — SODIUM CHLORIDE, POTASSIUM CHLORIDE, SODIUM LACTATE AND CALCIUM CHLORIDE: 600; 310; 30; 20 INJECTION, SOLUTION INTRAVENOUS at 10:44

## 2022-06-26 RX ADMIN — LEVOTHYROXINE SODIUM 88 MCG: 88 TABLET ORAL at 05:40

## 2022-06-26 RX ADMIN — ONDANSETRON 4 MG: 2 INJECTION INTRAMUSCULAR; INTRAVENOUS at 04:01

## 2022-06-26 RX ADMIN — POTASSIUM CHLORIDE 20 MEQ: 200 INJECTION, SOLUTION INTRAVENOUS at 22:19

## 2022-06-26 RX ADMIN — PANTOPRAZOLE SODIUM 40 MG: 40 INJECTION, POWDER, LYOPHILIZED, FOR SOLUTION INTRAVENOUS at 10:39

## 2022-06-26 RX ADMIN — GABAPENTIN 300 MG: 300 CAPSULE ORAL at 21:21

## 2022-06-26 RX ADMIN — HYDRALAZINE HYDROCHLORIDE 10 MG: 20 INJECTION INTRAMUSCULAR; INTRAVENOUS at 02:48

## 2022-06-26 RX ADMIN — ONDANSETRON 4 MG: 2 INJECTION INTRAMUSCULAR; INTRAVENOUS at 00:26

## 2022-06-26 ASSESSMENT — ENCOUNTER SYMPTOMS
HEMATURIA: 0
SORE THROAT: 0
WOUND: 0
HEMATEMESIS: 0
FATIGUE: 0
DIAPHORESIS: 0
ANOREXIA: 0
DIZZINESS: 0
AGITATION: 0
CHEST TIGHTNESS: 0
CONSTIPATION: 0
FREQUENCY: 0
DIARRHEA: 0
NAUSEA: 1
BACK PAIN: 0
ABDOMINAL PAIN: 1
NECK PAIN: 0
DYSURIA: 0
POLYPHAGIA: 0
VOMITING: 0
FEVER: 0
UNEXPECTED WEIGHT CHANGE: 0
FLANK PAIN: 0
CHILLS: 0
HEMATOCHEZIA: 0
SHORTNESS OF BREATH: 0
ANAL BLEEDING: 0

## 2022-06-26 ASSESSMENT — PATIENT HEALTH QUESTIONNAIRE - PHQ9: SUM OF ALL RESPONSES TO PHQ9 QUESTIONS 1 & 2: 0

## 2022-06-26 NOTE — ASSESSMENT & PLAN NOTE
BP elevated in ER, pain anxiety could be contributing  Resume hydralazine and metoprolol as tolerating PO  Will give as needed hydralazine

## 2022-06-26 NOTE — ED PROVIDER NOTES
Emergency Medicine Note  HPI   HISTORY OF PRESENT ILLNESS     Admitted here 6/19-21 for idiopathic pancreatitis.  Pain improved and tolerating po until 2 days ago.  Pain returned after eating.  Initially was mild but worsened today with nausea      History provided by:  Patient  Abdominal Pain  Pain location:  Epigastric  Pain quality: cramping    Pain radiates to:  Back  Pain severity:  Moderate  Onset quality:  Sudden  Duration:  2 days  Timing:  Constant  Progression:  Unchanged  Chronicity:  Recurrent  Relieved by:  Nothing  Worsened by:  Eating  Ineffective treatments:  None tried  Associated symptoms: nausea    Associated symptoms: no anorexia, no chest pain, no chills, no constipation, no diarrhea, no dysuria, no fatigue, no fever, no hematemesis, no hematochezia, no hematuria, no shortness of breath and no vomiting    Risk factors: no alcohol abuse          Patient History   PAST HISTORY     Reviewed from Nursing Triage:         Past Medical History:   Diagnosis Date   • Hypertension    • Hypothyroidism    • Lipid disorder    • Pancreatitis        History reviewed. No pertinent surgical history.    History reviewed. No pertinent family history.    Social History     Tobacco Use   • Smoking status: Never Smoker   • Smokeless tobacco: Never Used   Substance Use Topics   • Alcohol use: Never   • Drug use: Never         Review of Systems   REVIEW OF SYSTEMS     Review of Systems   Constitutional: Negative for chills, diaphoresis, fatigue and fever.   Respiratory: Negative for shortness of breath.    Cardiovascular: Negative for chest pain.   Gastrointestinal: Positive for abdominal pain and nausea. Negative for anal bleeding, anorexia, constipation, diarrhea, hematemesis, hematochezia and vomiting.   Genitourinary: Negative for dysuria, flank pain, frequency, hematuria and urgency.   Musculoskeletal: Negative for back pain.   Skin: Negative for rash.         VITALS     ED Vitals    Date/Time Temp Pulse Resp BP  SpO2 [facility]   06/26/22 0131 -- 63 18 163/74 100 %    06/25/22 2338 35.6 °C (96.1 °F) 69 16 202/93 97 % JSN        Pulse Ox %: 97 % (06/25/22 2345)  Pulse Ox Interpretation: Normal (06/25/22 2345)           Physical Exam   PHYSICAL EXAM     Physical Exam  Vitals and nursing note reviewed.   Constitutional:       Appearance: Normal appearance. She is normal weight.   HENT:      Head: Normocephalic.   Eyes:      Conjunctiva/sclera: Conjunctivae normal.   Cardiovascular:      Rate and Rhythm: Normal rate and regular rhythm.      Heart sounds: Normal heart sounds.   Pulmonary:      Effort: Pulmonary effort is normal.      Breath sounds: Normal breath sounds.   Abdominal:      General: Bowel sounds are normal.      Palpations: Abdomen is soft.      Tenderness: There is abdominal tenderness in the epigastric area and left upper quadrant. There is no right CVA tenderness, left CVA tenderness, guarding or rebound.   Skin:     General: Skin is warm and dry.   Neurological:      Mental Status: She is alert.   Psychiatric:         Mood and Affect: Mood normal.           PROCEDURES     Procedures     DATA     Results     Procedure Component Value Units Date/Time    SARS-CoV-2 (COVID-19), PCR Nasopharynx [[id number]] Collected: 06/26/22 0131    Specimen: Nasopharyngeal Swab from Nasopharynx Updated: 06/26/22 0137    Narrative:      The following orders were created for panel order SARS-CoV-2 (COVID-19), PCR Nasopharynx.  Procedure                               Abnormality         Status                     ---------                               -----------         ------                     SARS-CoV-2 (COVID-19), P...[[id number]]                      In process                   Please view results for these tests on the individual orders.    SARS-CoV-2 (COVID-19), PCR Nasopharynx [[id number]] Collected: 06/26/22 0131    Specimen: Nasopharyngeal Swab from Nasopharynx Updated: 06/26/22 0137    Basic metabolic panel [[id number]]   (Abnormal) Collected: 06/25/22 2341    Specimen: Blood, Venous Updated: 06/26/22 0018     Sodium 133 mEQ/L      Potassium 4.0 mEQ/L      Comment: Results obtained on plasma. Plasma Potassium values may be up to 0.4 mEQ/L less than serum values. The differences may be greater for patients with high platelet or white cell counts.        Chloride 95 mEQ/L      CO2 27 mEQ/L      BUN 17 mg/dL      Creatinine 1.4 mg/dL      Glucose 112 mg/dL      Calcium 9.2 mg/dL      eGFR 36.9 mL/min/1.73m*2      Anion Gap 11 mEQ/L     Lipase [[id number]]  (Abnormal) Collected: 06/25/22 2341    Specimen: Blood, Venous Updated: 06/26/22 0013     Lipase 164 U/L     Hepatic function panel [[id number]]  (Normal) Collected: 06/25/22 2341    Specimen: Blood, Venous Updated: 06/26/22 0013     Albumin 3.7 g/dL      Bilirubin, Total 0.7 mg/dL      Bilirubin, Direct 0.2 mg/dL      Alkaline Phosphatase 61 IU/L      AST (SGOT) 22 IU/L      ALT (SGPT) 15 IU/L      Total Protein 7.2 g/dL      Comment: Test performed on plasma which typically contains approximately 0.4 g/dL more protein than serum.       CBC and differential [[id number]]  (Abnormal) Collected: 06/25/22 2341    Specimen: Blood, Venous Updated: 06/25/22 2351     WBC 7.38 K/uL      RBC 4.10 M/uL      Hemoglobin 11.3 g/dL      Hematocrit 35.0 %      MCV 85.4 fL      MCH 27.6 pg      MCHC 32.3 g/dL      RDW 12.1 %      Platelets 305 K/uL      MPV 9.8 fL      Differential Type Auto     nRBC 0.0 %      Immature Granulocytes 0.4 %      Neutrophils 63.5 %      Lymphocytes 25.3 %      Monocytes 6.5 %      Eosinophils 3.9 %      Basophils 0.4 %      Immature Granulocytes, Absolute 0.03 K/uL      Neutrophils, Absolute 4.68 K/uL      Lymphocytes, Absolute 1.87 K/uL      Monocytes, Absolute 0.48 K/uL      Eosinophils, Absolute 0.29 K/uL      Basophils, Absolute 0.03 K/uL           Imaging Results          CT ABDOMEN PELVIS WITH IV CONTRAST (Preliminary result)  Result time 06/26/22 01:23:23    ED  Interpretation      History: Admitted here 6/19-21 for idiopathic pancreatitis.  Pain improved and tolerating po until 2 days ago.  Pain returned after eating.  Initially was mild but worsened today with nausea  visi 320, 75ml no reaction    Preliminary Impression:    Small cystic findings along the pancreatic duct, of uncertain significance. Comparison of this finding to the patient's previous study is limited due to the absence of IV contrast on the previous study. Slight peripancreatic stranding, without worsening in comparison to the previous study from June 19.    No bowel distention, free fluid, free air or hydronephrosis.    Interpreted by: Manuel Heath MD, Jun 26, 2022 01:19 AM                              No orders to display       Scoring tools                                 ED Course & MDM   MDM / ED COURSE / CLINICAL IMPRESSIONS / DISPO     MDM    ED Course as of 06/26/22 0215   Sun Jun 26, 2022   0014 Lipase(!): 164  Increased six days ago was 98  [RS]   0122 Small cystic findings along the pancreatic duct, of uncertain significance. Comparison of this finding to the patient's previous study is limited due to the absence of IV contrast on the previous study. Slight peripancreatic stranding, without worsening in comparison to the previous study from June 19.    No bowel distention, free fluid, free air or hydronephrosis. [RALPH]      ED Course User Index  [RALPH] Parker Blanc, DO  [RS] Joey Avendaño PA C         Clinical Impressions as of 06/26/22 0215   Acute pancreatitis, unspecified complication status, unspecified pancreatitis type   Pancreatic cyst     Admit / Observation         Joey Avendaño PA C  06/26/22 0216

## 2022-06-26 NOTE — ASSESSMENT & PLAN NOTE
Recently dx with pancreatitis  CT A/P w/ Contrast: Cystic areas are seen in the body tail possible sidebranch IPMNs. Recommend nonemergent follow-up with MRI/MRCP examination.   Appreciate GI consultation. Recommend outpatient follow-up with GI and repeat MRI/MRCP in 4-6 weeks.   Patient is from [place], she is here visiting family. As per GI note she has outpatient follow-up with GI scheduled for July 8th  Continue IVF. Tolerated CLD. Will advance to low fiber/low residue for lunch. If tolerates then can plan for discharge with close follow-up.   Attempted to call son to provide update, voicemail message left, awaiting call back.

## 2022-06-26 NOTE — CONSULTS
Gastroenterology  Consultation Note       REASON FOR CONSULT   pancreatitis    Consult requested by:   HISTORY OF PRESENT ILLNESS      This is a 71 y.o. female with a past medical history of recurrent pancreatitis (~2017, 6/20/22), hypertension, hypothyroidism, hyperlipidemia presents with abdominal pain, nausea and vomiting.    Patient was recently admitted and evaluated by GI team 6/20/2022 for epigastric abdominal pain and poor p.o. intake unrelieved by Tums, Maalox, Pepcid.  She reported history of pancreatitis 5 years prior of unclear etiology, thought to be idiopathic.  She denied EtOH use.  LFTs were normal, lipase 242, triglycerides 74, calcium 8.8, IgG4 16.7.  CT noted edema with diffuse stranding surrounding the pancreas in keeping with acute pancreatitis.  Abdominal ultrasound without evidence of cholelithiasis.  She was treated supportively and discharged home the following day.    After discharge pain improved and she was tolerating p.o. until 2 days ago when she noticed postprandial epigastric pain that progressively worsened associate with nausea.  She describes her pain as cramping radiating to the back, constant.    In the ED she was afebrile hemodynamically stable.  Labs notable for lipase 164, from 98, normal LFTs, BUN 17, hematocrit 35, calcium 9.2.  CT A/P with contrast noted no evidence of acute inflammatory obstructive process in the abdomen or pelvis.  Several cystic lesions including a millimeter focus in the body and an 8 mm focus in the tail with 3 mm PD in the tail, but normal caliber in the head/body, concerning for sidebranch IPMN.  She was given morphine x1, Zofran x2, IVF. This AM, she is feeling better, tolerated some sips of liquids.       PAST MEDICAL AND SURGICAL HISTORY      PMHx:  Past Medical History:   Diagnosis Date   • Hypertension    • Hypothyroidism    • Lipid disorder    • Pancreatitis        PSHx:  Past Surgical History:   Procedure Laterality Date   • HYSTERECTOMY          PCP:   Pt States, No Pcp    MEDICATIONS      Prior to Admission medications    Medication Sig Start Date End Date Taking? Authorizing Provider   lisinopriL (PRINIVIL) 20 mg tablet Take 20 mg by mouth daily.   Yes Trisha Lucio MD   metoprolol succinate XL (TOPROL-XL) 25 mg 24 hr tablet Take 25 mg by mouth daily.   Yes Trisha Lucio MD   gabapentin (NEURONTIN) 300 mg capsule Take 300 mg by mouth nightly.    Trisha Lucio MD   levothyroxine (SYNTHROID) 88 mcg tablet Take 88 mcg by mouth daily.    Trisha Lucio MD   metFORMIN (GLUCOPHAGE) 500 mg tablet Take 500 mg by mouth 2 (two) times a day with meals.    Trisha Lucio MD   omeprazole (PriLOSEC) 20 mg capsule Take 20 mg by mouth daily before breakfast.    Trisha Lucio MD   simvastatin (ZOCOR) 20 mg tablet Take 20 mg by mouth nightly.    Trisha Lucio MD       Home medications were personally reviewed.    ALLERGIES      Patient has no known allergies.    FAMILY HISTORY      Family History   Problem Relation Age of Onset   • Lung cancer Maternal Grandfather        SOCIAL HISTORY      Social History     Socioeconomic History   • Marital status: Single     Spouse name: None   • Number of children: None   • Years of education: None   • Highest education level: None   Tobacco Use   • Smoking status: Never Smoker   • Smokeless tobacco: Never Used   Vaping Use   • Vaping Use: Never used   Substance and Sexual Activity   • Alcohol use: Never   • Drug use: Never       REVIEW OF SYSTEMS      Review of Systems    PHYSICAL EXAMINATION      Temp:  [35.6 °C (96.1 °F)-36.3 °C (97.3 °F)] 36.3 °C (97.3 °F)  Heart Rate:  [63-70] 70  Resp:  [16-19] 18  BP: (154-202)/(56-93) 160/69  Body mass index is 25.41 kg/m².    Physical Exam  Constitutional:       General: She is not in acute distress.  Cardiovascular:      Rate and Rhythm: Normal rate.   Pulmonary:      Effort: Pulmonary effort is normal. No respiratory distress.    Abdominal:      General: There is no distension.      Palpations: Abdomen is soft.      Tenderness: There is no abdominal tenderness.   Neurological:      Mental Status: She is alert and oriented to person, place, and time.         LABS / IMAGING / EKG        Labs  Results from last 7 days   Lab Units 06/25/22  2341 06/21/22  0446 06/20/22  0346 06/19/22  1649   SODIUM mEQ/L 133* 141 141 135*   POTASSIUM mEQ/L 4.0 3.4* 3.9 3.7   CHLORIDE mEQ/L 95* 108 105 99   CO2 mEQ/L 27 28 29 24   BUN mg/dL 17 7* 12 18   CREATININE mg/dL 1.4* 0.7 1.1 1.3*   CALCIUM mg/dL 9.2 9.1 8.8* 9.1   ALBUMIN g/dL 3.7  --  3.0* 3.6   BILIRUBIN TOTAL mg/dL 0.7  --  0.6 0.6   ALK PHOS IU/L 61  --  52 59   ALT IU/L 15  --  11 12   AST IU/L 22  --  16 19   GLUCOSE mg/dL 112* 82 132* 161*           Results from last 7 days   Lab Units 06/21/22  0446   MAGNESIUM mg/dL 1.4*     Results from last 7 days   Lab Units 06/25/22  2341 06/21/22  0446 06/19/22  1649   WBC K/uL 7.38 6.16 8.20   HEMOGLOBIN g/dL 11.3* 10.3* 11.5*   HEMATOCRIT % 35.0 33.0* 36.2   PLATELETS K/uL 305 239 279         Imaging  CT ABDOMEN PELVIS WITH IV CONTRAST   ED Interpretation     History: Admitted here 6/19-21 for idiopathic pancreatitis.  Pain improved and tolerating po until 2 days ago.  Pain returned after eating.  Initially was mild but worsened today with nausea  visi 320, 75ml no reaction    Preliminary Impression:    Small cystic findings along the pancreatic duct, of uncertain significance. Comparison of this finding to the patient's previous study is limited due to the absence of IV contrast on the previous study. Slight peripancreatic stranding, without worsening in comparison to the previous study from June 19.    No bowel distention, free fluid, free air or hydronephrosis.    Interpreted by: Manuel Heath MD, Jun 26, 2022 01:19 AM      Final Result   IMPRESSION:   1. No evidence for acute inflammatory or obstructive process in the abdomen and   pelvis. Normal  appendix is identified in the right lower quadrant.   2. Cystic areas are seen in the body tail possible sidebranch IPMNs. Recommend   nonemergent follow-up with MRI/MRCP examination.      COMMENT:      Technique: CT examination of the abdomen and pelvis was performed utilizing   contiguous 2.5 mm transaxial sections. Images were acquired  following the   administration of 75 cc Visipaque intravenous contrast.  Post contrast imaging   was obtained in the arterial and venous phases of enhancement.  Oral contrast   was administered.   Coronal and sagittal reformations are obtained.         CT DOSE:  One or more dose reduction techniques (e.g. automated exposure   control, adjustment of the mA and/or kV according to patient size, use of   iterative reconstruction technique) utilized for this examination      Comparison studies: Prior CT of the abdomen and pelvis from 6/19/2022..      Lung bases: Lung bases are clear      Liver: The liver is normal in size.  There is no focal mass.  Hepatic veins and   portal veins are patent without focal filling defects to suggest thrombosis..      Gallbladder:  No gallstones.  No gallbladder wall thickening..      Spleen: Spleen is normal in size without focal mass lesion..      Pancreas: Several cystic areas are identified in the pancreas including an 8 mm   area in the body and an 8mm focus in the tail. The pancreatic duct is slightly   prominent measuring approximately 3 mm in the tail but normal caliber and   caliber in the head and proximal body measuring 2 mm. This may be on the basis   of sidebranch IPMN. Further assessment could be performed with a nonemergent   MRI..      Adrenals: The adrenals are normal bilaterally without focal mass..      Kidneys, ureters and bladder: No hydronephrosis. No renal calculi. Symmetric   enhancement and excretion. 8 mm fat density lesion in the upper pole of the left   kidney likely an angiomyolipoma was also present on previous study.  Additional   subcentimeter hypodensities are seen bilaterally, likely small cysts.      Retroperitoneal structures: There is no abdominal aortic aneurysm.  There is no   retroperitoneal adenopathy or retroperitoneal mass..      Bowel and mesentery: No evidence of a focal inflammatory or obstructive process.   Normal appendix. Small fat-containing      Lymph nodes: No pathologic lymphadenopathy..      Pelvis: Hysterectomy. No adnexal mass.      Bones: Multilevel degenerative changes in the lumbar spine          ASSESSMENT AND PLAN      * Acute pancreatitis, unspecified complication status, unspecified pancreatitis type  Assessment & Plan  -71 y.o. female with a past medical history of recurrent pancreatitis (~2017, 6/20/22), hypertension, hypothyroidism, hyperlipidemia presents with abdominal pain, nausea and vomiting.  -idiopathic pancreatitis ~5 years ago, recent admission 6/20/22 for acute mild interstitial pancreatitis. No EtOH use, no cholelithiasis on US, trigs 74, IgG4 16.7, Ca 8.8.  -p/w 2d epigastric pain similar to prior with associated nausea.  -lipase 164 from 98.   -CT without inflammation, but noted several cystic lesions in pancreas, 8mm in body and 8mm in tail with PD dilation to 3 mm in the tail, c/f sidebranch IPMN    -Patient with recurrent/protacted acute pancreatitis given elevated lipase and exam. Suspect etiology of underlying pancreatitis 2/2 likely IPMN given dilated PD in tail. Treatment at this time remains supportive with close OP follow up for MRCP to further evaluate parenchyma/cysts.    Plan:  -CLD --> ADAT to low fat/residue  -IVF with LR, at least 125 cc/hr as tolerated. Does not appear to be at risk for volume overload from a cardiopulmonary perspective  -Pain control, nausea control per primary team  -PPI BID for gastric protection, Miralax 17g qd while receiving opioids  -No indication for empiric antibiotics for acute pancreatitis in this patient.  There is no recommendation for  the routine use of prophylactic antibiotics, even in severe acute pancreatitis, unless there is a specific concern for an extrapancreatic infection.  She does not yet have imaging reflecting necrosis, and the use of antibiotics in sterile necrosis is not recommended. Pancreatitis in itself often leads to the mounting of fevers and tachycardia as an inflammatory response.  If concerned for infection, please check blood cultures.  -defer genetic testing given low suspicion due to age  -needs close OP follow up for MRCP WWO once symptoms resolve             VTE Assessment: Padua VTE Score: 1  Code Status: Full Code  Estimated discharge date: 6/29/2022

## 2022-06-26 NOTE — ASSESSMENT & PLAN NOTE
-71 y.o. female with a past medical history of recurrent pancreatitis (~2017, 6/20/22), hypertension, hypothyroidism, hyperlipidemia presents with abdominal pain, nausea and vomiting.  -idiopathic pancreatitis ~5 years ago, recent admission 6/20/22 for acute mild interstitial pancreatitis. No EtOH use, no cholelithiasis on US, trigs 74, IgG4 16.7, Ca 8.8.  -p/w 2d epigastric pain similar to prior with associated nausea.  -lipase 164 from 98.   -CT without inflammation, but noted several cystic lesions in pancreas, 8mm in body and 8mm in tail with PD dilation to 3 mm in the tail, c/f sidebranch IPMN    -Patient with recurrent/protacted acute pancreatitis given elevated lipase and exam. Suspect etiology of underlying pancreatitis 2/2 likely IPMN given dilated PD in tail. Treatment at this time remains supportive with close OP follow up for MRCP to further evaluate parenchyma/cysts.    Plan:  -CLD --> ADAT to low fat/residue  -IVF with LR, at least 125 cc/hr as tolerated. Does not appear to be at risk for volume overload from a cardiopulmonary perspective  -Pain control, nausea control per primary team  -PPI BID for gastric protection, Miralax 17g qd while receiving opioids  -No indication for empiric antibiotics for acute pancreatitis in this patient.  There is no recommendation for the routine use of prophylactic antibiotics, even in severe acute pancreatitis, unless there is a specific concern for an extrapancreatic infection.  She does not yet have imaging reflecting necrosis, and the use of antibiotics in sterile necrosis is not recommended. Pancreatitis in itself often leads to the mounting of fevers and tachycardia as an inflammatory response.  If concerned for infection, please check blood cultures.  -defer genetic testing given low suspicion due to age  -needs close OP follow up for MRCP WWO once symptoms resolve

## 2022-06-26 NOTE — H&P
Hospital Medicine Service -  History & Physical        CHIEF COMPLAINT   Abdominal pain     HISTORY OF PRESENT ILLNESS      Andressa Whittington is a 71 y.o. female with a past medical history of hypertension, hypothyroidism, recent admission for pancreatitis on 6/19/2022 who presents with abdominal pain.  Patient with son who helps with translation patient patient prefers to speak in [place] and mix of English and since I do understand the language son wish to translate to the patient and helped giving history states when she was discharged her pain was significantly improved.  She is able to eat soups and doing okay at home.  On the 24th she started having some abdominal pain again.  She did not want to come back to the hospital.  Family tried giving her just regular fluids to drink.  In the morning on the 25th she felt okay but by the evening her abdominal pain started again after having some chicken soup and started radiating to her back which was different than prior admission.  Family was concerned so brought her back to the hospital.  She is having associated nausea.  She denies any fevers chills headaches dizziness chest pain rashes shortness of breath cough or headache.  Family patient has had weight loss over the last 4 years but in the last year it has stabilized.  She has had a full body imaging for this weight loss and work-up.  sHe is currently visiting from [place].  Therefore has not had time to see her primary care doctor since she was just discharged and she has not traveled back.  She states she has a appointment to see a gastroenterologist on July 8.  She is taking medications that she was discharged on.    In ER they found that patient's pancreatic enzyme level lipase was higher than before.  They again CT of her abdomen but this time with IV contrast and showed small cystic lesion near pancreas.  They wanted to admit her for pancreatitis.  They gave her morphine and IV fluids.    PAST MEDICAL AND  SURGICAL HISTORY      Past Medical History:   Diagnosis Date   • Hypertension    • Hypothyroidism    • Lipid disorder    • Pancreatitis        Past Surgical History:   Procedure Laterality Date   • HYSTERECTOMY         MEDICATIONS      Prior to Admission medications    Medication Sig Start Date End Date Taking? Authorizing Provider   lisinopriL (PRINIVIL) 20 mg tablet Take 20 mg by mouth daily.   Yes Trisha Lucio MD   metoprolol succinate XL (TOPROL-XL) 25 mg 24 hr tablet Take 25 mg by mouth daily.   Yes Trisha Lucio MD   gabapentin (NEURONTIN) 300 mg capsule Take 300 mg by mouth nightly.    Trisha Lucio MD   levothyroxine (SYNTHROID) 88 mcg tablet Take 88 mcg by mouth daily.    Trisha Lucio MD   metFORMIN (GLUCOPHAGE) 500 mg tablet Take 500 mg by mouth 2 (two) times a day with meals.    Trisha Lucio MD   omeprazole (PriLOSEC) 20 mg capsule Take 20 mg by mouth daily before breakfast.    Trisha Lucio MD   simvastatin (ZOCOR) 20 mg tablet Take 20 mg by mouth nightly.    Trisha Lucio MD       ALLERGIES      Patient has no known allergies.    FAMILY HISTORY      History reviewed. No pertinent family history.    SOCIAL HISTORY      Social History     Socioeconomic History   • Marital status: Single     Spouse name: None   • Number of children: None   • Years of education: None   • Highest education level: None   Tobacco Use   • Smoking status: Never Smoker   • Smokeless tobacco: Never Used   Substance and Sexual Activity   • Alcohol use: Never   • Drug use: Never       REVIEW OF SYSTEMS      Review of Systems   Constitutional: Negative for fever and unexpected weight change.   HENT: Negative for congestion and sore throat.    Respiratory: Negative for chest tightness and shortness of breath.    Gastrointestinal: Positive for abdominal pain and nausea. Negative for constipation, diarrhea and vomiting.   Endocrine: Negative for polyphagia and polyuria.  "  Genitourinary: Negative for dysuria and frequency.   Musculoskeletal: Negative for back pain and neck pain.   Skin: Negative for rash and wound.   Neurological: Negative for dizziness and syncope.   Psychiatric/Behavioral: Negative for agitation and behavioral problems.       PHYSICAL EXAMINATION      Temp:  [35.6 °C (96.1 °F)] 35.6 °C (96.1 °F)  Heart Rate:  [63-69] 63  Resp:  [16-18] 18  BP: (163-202)/(74-93) 163/74  Body mass index is 25.13 kg/m².    Visit Vitals  BP (!) 163/74 (BP Location: Right upper arm, Patient Position: Lying)   Pulse 63   Temp (!) 35.6 °C (96.1 °F) (Tympanic)   Resp 18   Ht 1.549 m (5' 1\")   Wt 60.3 kg (133 lb)   SpO2 100%   BMI 25.13 kg/m²       General Appearance:    Alert, cooperative, no distress, appears stated age   Head:    Normocephalic, without obvious abnormality, atraumatic   Eyes:    PERRL, conjunctiva/corneas clear, EOM's intact eyes                Throat:   Lips, mucosa, and tongue normal; teeth and gums normal   Neck:   Supple, symmetrical, trachea midline   Back:     Symmetric, no curvature, ROM normal, no CVA tenderness   Lungs:     Clear to auscultation bilaterally, respirations unlabored   Chest wall:    No tenderness or deformity   Heart:    Regular rate and rhythm, S1 and S2 normal   Abdomen:     Soft, non-tender, bowel sounds active all four quadrants,     no masses, no organomegaly           Extremities:   Extremities normal, atraumatic, no cyanosis or edema   Pulses:   2+ and symmetric all extremities   Skin:   Skin color, texture, turgor normal, no rashes or lesions       Neurologic:   CNII-XII intact. Normal strength, alert and oriented to person place time and situation     LABS / IMAGING / EKG        Labs  CBC Results       06/25/22 06/21/22 06/19/22     2341 0446 1649    WBC 7.38 6.16 8.20    RBC 4.10 3.66 4.10    HGB 11.3 10.3 11.5    HCT 35.0 33.0 36.2    MCV 85.4 90.2 88.3    MCH 27.6 28.1 28.0    MCHC 32.3 31.2 31.8     239 279        CMP Results  "      06/25/22 06/21/22 06/20/22     2341 0446 0346     141 141    K 4.0 3.4 3.9    Cl 95 108 105    CO2 27 28 29    Glucose 112 82 132    BUN 17 7 12    Creatinine 1.4 0.7 1.1    Calcium 9.2 9.1 8.8    Anion Gap 11 5 7    AST 22 -- 16    ALT 15 -- 11    Albumin 3.7 -- 3.0    EGFR 36.9 >60.0 48.7         Comment for K at 2341 on 06/25/22: Results obtained on plasma. Plasma Potassium values may be up to 0.4 mEQ/L less than serum values. The differences may be greater for patients with high platelet or white cell counts.        Troponin I Results       06/19/22 06/19/22 1836 1649    HS Troponin I 13.70 12.60        Microbiology Results     Procedure Component Value Units Date/Time    SARS-CoV-2 (COVID-19), PCR Nasopharynx [[id number]]  (Normal) Collected: 06/19/22 1835    Specimen: Nasopharyngeal Swab from Nasopharynx Updated: 06/19/22 1913    Narrative:      The following orders were created for panel order SARS-CoV-2 (COVID-19), PCR Nasopharynx.  Procedure                               Abnormality         Status                     ---------                               -----------         ------                     SARS-CoV-2 (COVID-19), P...[[id number]]  Normal              Final result                 Please view results for these tests on the individual orders.    SARS-CoV-2 (COVID-19), PCR Nasopharynx [[id number]]  (Normal) Collected: 06/19/22 1835    Specimen: Nasopharyngeal Swab from Nasopharynx Updated: 06/19/22 1913     SARS-CoV-2 (COVID-19) Negative    Narrative:      Nursing instructions: Obtain nasopharyngeal swab ONLY.  Send swab in viral transport media. If RSV/FLU swab is also ordered, both Covid and RSV/FLU can be performed on single swab.        UA Results       06/19/22     2100    Color Colorless    Clarity Clear    Glucose Negative    Bilirubin Negative    Ketones Negative    Sp Grav 1.009    Blood Negative    Ph 7.0    Protein +1    Urobilinogen 0.2    Nitrite Negative    Leuk Est  Negative    WBC 0 TO 3    RBC 0 TO 4    Bacteria None Seen         Comment for Leuk Est at 2100 on 06/19/22: Results can be falsely negative due to high specific gravity, some antibiotics, glucose >3 g/dl, or WBC other than neutrophils.            Imaging  Patient Name:   caden tejada  Exam(s):  a/p standard  CT   MR#: [id number]  History: Admitted here 6/19-21 for idiopathic pancreatitis. Pain improved and  tolerating po until 2 days ago. Pain returned after eating. Initially was mild but  worsened today with nausea visi 320, 75ml no reaction  Preliminary Impression:  Small cystic findings along the pancreatic duct, of uncertain significance.  Comparison of this finding to the patient's previous study is limited due to the  absence of IV contrast on the previous study. Slight peripancreatic stranding,  without worsening in comparison to the previous study from June 19.  No bowel distention, free fluid, free air or hydronephrosis.  Interpreted by: Manuel Heath MD, Jun 26, 2022 01:19 AM         ECG/Telemetry  ECG done on 6/19/22- sinus 80 qtc 472    ASSESSMENT AND PLAN           Hypothyroidism  Assessment & Plan  Continue with Synthroid    GERD (gastroesophageal reflux disease)  Assessment & Plan  Hold ompeprazole  Will consider starting famotidine     JONE (acute kidney injury) (CMS/HCC)  Assessment & Plan  Likely secondary pancreatitis  LR  Repeat BMP in am     Type 2 diabetes mellitus (CMS/HCC)  Assessment & Plan  On metformin  Will hold for now and give insulin sliding scale    Hypertension  Assessment & Plan  BP elevated in ER, pain anxiety could be contributing  Holding lisinopril and metoprolol while n.p.o.  Will give as needed hydralazine      * Acute pancreatitis, unspecified complication status, unspecified pancreatitis type  Assessment & Plan  Recently dx with pancreatitis  Repeat ct now w/IV contrast shows possible cystic lesion  GI consult  ?MRCP?  Lactate ringers  Dilaudid prn for pain  Hold  omeprazole (?cause of pancreatitis-rare side effect?)         VTE Assessment: Padua VTE Score: 1  Code Status: Full Code  Estimated discharge date: 6/28/2022     Merline Thrasher DO  6/26/2022

## 2022-06-26 NOTE — ED ATTESTATION NOTE
Physician Attestation:     I personally saw and evaluated the patient, participated in the management, and agree with the findings in the above note except as where stated.  The Physician Assistant and I discussed  the case, workup, and disposition.      Chief Complaint  Chief Complaint   Patient presents with   • Abdominal Pain     Pt recently here and admitted for pancreatitis, d/c home on Tuesday. Pain returned and having difficulty eating/drinking. Took Zofran today around 8pm. Pt in same location abd, but now also in back which is new.      71 f presents for abd pain  Admitted recently for idiopathic pancreatitis  dced home  Pain returned  Nausea, difficulty eating no vomiting   Radiates to back    Non toxic, NAD, VS reviewed  rrr  Lungs cta  No flank or back ttp  abd soft mild epigastric ttp no ruq ttp non peritoneal      Parker Blanc, DO  06/26/22 0003

## 2022-06-27 VITALS
WEIGHT: 134.5 LBS | HEART RATE: 85 BPM | RESPIRATION RATE: 12 BRPM | BODY MASS INDEX: 25.39 KG/M2 | SYSTOLIC BLOOD PRESSURE: 148 MMHG | TEMPERATURE: 97.9 F | HEIGHT: 61 IN | DIASTOLIC BLOOD PRESSURE: 65 MMHG | OXYGEN SATURATION: 98 %

## 2022-06-27 LAB
ANION GAP SERPL CALC-SCNC: 8 MEQ/L (ref 3–15)
BUN SERPL-MCNC: 6 MG/DL (ref 8–20)
CALCIUM SERPL-MCNC: 9.3 MG/DL (ref 8.9–10.3)
CHLORIDE SERPL-SCNC: 105 MEQ/L (ref 98–109)
CO2 SERPL-SCNC: 31 MEQ/L (ref 22–32)
CREAT SERPL-MCNC: 1.1 MG/DL (ref 0.6–1.1)
GFR SERPL CREATININE-BSD FRML MDRD: 48.7 ML/MIN/1.73M*2
GLUCOSE BLD-MCNC: 112 MG/DL (ref 70–99)
GLUCOSE BLD-MCNC: 116 MG/DL (ref 70–99)
GLUCOSE SERPL-MCNC: 120 MG/DL (ref 70–99)
POCT TEST: ABNORMAL
POCT TEST: ABNORMAL
POTASSIUM SERPL-SCNC: 3.9 MEQ/L (ref 3.6–5.1)
SODIUM SERPL-SCNC: 144 MEQ/L (ref 136–144)

## 2022-06-27 PROCEDURE — 36415 COLL VENOUS BLD VENIPUNCTURE: CPT | Performed by: PHYSICIAN ASSISTANT

## 2022-06-27 PROCEDURE — 80048 BASIC METABOLIC PNL TOTAL CA: CPT | Performed by: PHYSICIAN ASSISTANT

## 2022-06-27 PROCEDURE — 25000000 HC PHARMACY GENERAL: Performed by: STUDENT IN AN ORGANIZED HEALTH CARE EDUCATION/TRAINING PROGRAM

## 2022-06-27 PROCEDURE — 63600000 HC DRUGS/DETAIL CODE: Performed by: HOSPITALIST

## 2022-06-27 PROCEDURE — 63700000 HC SELF-ADMINISTRABLE DRUG: Performed by: HOSPITALIST

## 2022-06-27 PROCEDURE — 99239 HOSP IP/OBS DSCHRG MGMT >30: CPT | Performed by: STUDENT IN AN ORGANIZED HEALTH CARE EDUCATION/TRAINING PROGRAM

## 2022-06-27 RX ORDER — POLYETHYLENE GLYCOL 3350 17 G/17G
17 POWDER, FOR SOLUTION ORAL DAILY
Qty: 3 PACKET | Refills: 0 | Status: SHIPPED | OUTPATIENT
Start: 2022-06-28 | End: 2022-07-01

## 2022-06-27 RX ORDER — TRAMADOL HYDROCHLORIDE 50 MG/1
50 TABLET ORAL EVERY 6 HOURS PRN
Qty: 10 TABLET | Refills: 0 | Status: SHIPPED | OUTPATIENT
Start: 2022-06-27 | End: 2022-07-02

## 2022-06-27 RX ADMIN — PANTOPRAZOLE SODIUM 40 MG: 40 INJECTION, POWDER, LYOPHILIZED, FOR SOLUTION INTRAVENOUS at 10:53

## 2022-06-27 RX ADMIN — SODIUM CHLORIDE, POTASSIUM CHLORIDE, SODIUM LACTATE AND CALCIUM CHLORIDE: 600; 310; 30; 20 INJECTION, SOLUTION INTRAVENOUS at 05:54

## 2022-06-27 RX ADMIN — SODIUM CHLORIDE, POTASSIUM CHLORIDE, SODIUM LACTATE AND CALCIUM CHLORIDE: 600; 310; 30; 20 INJECTION, SOLUTION INTRAVENOUS at 13:16

## 2022-06-27 RX ADMIN — LEVOTHYROXINE SODIUM 88 MCG: 88 TABLET ORAL at 05:52

## 2022-06-27 RX ADMIN — ATORVASTATIN CALCIUM 10 MG: 10 TABLET, FILM COATED ORAL at 10:52

## 2022-06-27 NOTE — DISCHARGE SUMMARY
Hospital Medicine Service -  Inpatient Discharge Summary        BRIEF OVERVIEW   Admitting Provider: Merline Thrasher DO  Attending Provider: Pino Polanco DO Attending phys phone: [phone]    PCP: [place], No Pcp [phone]    Admission Date: 6/25/2022  Discharge Date: 6/27/2022     DISCHARGE DIAGNOSES      Primary Discharge Diagnosis  Acute pancreatitis, unspecified complication status, unspecified pancreatitis type    Secondary Discharge Diagnoses  Active Hospital Problems    Diagnosis Date Noted   • Acute pancreatitis, unspecified complication status, unspecified pancreatitis type 06/19/2022     Priority: High   • JONE (acute kidney injury) (CMS/HCC) 06/19/2022   • GERD (gastroesophageal reflux disease) 06/19/2022   • Hypertension 06/19/2022   • Hypothyroidism 06/19/2022   • Type 2 diabetes mellitus (CMS/HCC) 06/19/2022      Resolved Hospital Problems   No resolved problems to display.       Problem List on Day of Discharge  * Acute pancreatitis, unspecified complication status, unspecified pancreatitis type  Assessment & Plan  Recently dx with pancreatitis  CT A/P w/ Contrast: Cystic areas are seen in the body tail possible sidebranch IPMNs. Recommend nonemergent follow-up with MRI/MRCP examination.   Appreciate GI consultation. Recommend outpatient follow-up with GI and repeat MRI/MRCP in 4-6 weeks.   Patient is from [place], she is here visiting family. As per GI note she has outpatient follow-up with GI scheduled for July 8th  Continue IVF. Tolerated CLD. Will advance to low fiber/low residue for lunch. If tolerates then can plan for discharge with close follow-up.   Attempted to call son to provide update, voicemail message left, awaiting call back.     Hypothyroidism  Assessment & Plan  Continue with Synthroid    GERD (gastroesophageal reflux disease)  Assessment & Plan  Continue omeprazole     JONE (acute kidney injury) (CMS/HCC)  Assessment & Plan  Likely secondary pancreatitis  Resolved  with IVF    Type 2 diabetes mellitus (CMS/HCC)  Assessment & Plan  On metformin  Resume at discharge    Hypertension  Assessment & Plan  BP elevated in ER, pain anxiety could be contributing  Resume hydralazine and metoprolol as tolerating PO  Will give as needed hydralazine    SUMMARY OF HOSPITALIZATION      Presenting Problem/History of Present Illness  This is a 71 y.o. year-old female admitted on 6/25/2022 with Pancreatic cyst [K86.2]  Acute pancreatitis, unspecified complication status, unspecified pancreatitis type [K85.90].    Hospital Course  Andressa Whittington was admitted to [facility] on 6/25 with chief complaint of abdominal pain. She was recently admitted and discharged from this hospital after treatment for acute pancreatitis from 6/19-6/21. She was noted to have JONE. She was seen in consultation by gastroenterology. CT A/P was done with contrast noting several cystic lesions concerning for IPMNs. She was hydrated with IVF and JONE resolved. Her diet was slowly advanced as pain improved. She will be discharged with close follow-up with PCP and GI after she is tolerating regular diet without pain. She is originally from [place] and will follow-up with GI and PCP upon return to [place]. Recommended for MRCP in 4-6 weeks to further evaluate her pancreas pathology after resolution of current inflammation    Exam on Day of Discharge  Physical Exam  Vitals and nursing note reviewed.   Constitutional:       General: She is not in acute distress.  HENT:      Head: Normocephalic and atraumatic.   Cardiovascular:      Rate and Rhythm: Normal rate and regular rhythm.   Pulmonary:      Effort: Pulmonary effort is normal. No respiratory distress.      Breath sounds: Normal breath sounds.   Abdominal:      General: Bowel sounds are normal. There is no distension.      Palpations: Abdomen is soft.      Tenderness: There is no abdominal tenderness.   Musculoskeletal:         General: No swelling. Normal range of motion.   Skin:      General: Skin is warm and dry.   Neurological:      General: No focal deficit present.      Mental Status: She is alert and oriented to person, place, and time.   Psychiatric:         Mood and Affect: Mood normal.         Behavior: Behavior normal.         Consults During Admission  IP CONSULT TO GASTROENTEROLOGY    DISCHARGE MEDICATIONS               Medication List      START taking these medications    polyethylene glycol 17 gram packet  Commonly known as: MIRALAX  Start taking on: June 28, 2022  Take 17 g by mouth daily for 3 days.  Dose: 17 g     traMADoL 50 mg tablet  Commonly known as: ULTRAM  Take 1 tablet (50 mg total) by mouth every 6 (six) hours as needed for severe pain for up to 5 days.  Dose: 50 mg        CONTINUE taking these medications    gabapentin 300 mg capsule  Commonly known as: NEURONTIN  Take 300 mg by mouth nightly.  Dose: 300 mg     levothyroxine 88 mcg tablet  Commonly known as: SYNTHROID  Take 88 mcg by mouth daily.  Dose: 88 mcg     lisinopriL 20 mg tablet  Commonly known as: PRINIVIL  Take 20 mg by mouth daily.  Dose: 20 mg     metFORMIN 500 mg tablet  Commonly known as: GLUCOPHAGE  Take 500 mg by mouth 2 (two) times a day with meals.  Dose: 500 mg     metoprolol succinate XL 25 mg 24 hr tablet  Commonly known as: TOPROL-XL  Take 25 mg by mouth daily.  Dose: 25 mg     omeprazole 20 mg capsule  Commonly known as: PriLOSEC  Take 20 mg by mouth daily before breakfast.  Dose: 20 mg     simvastatin 20 mg tablet  Commonly known as: ZOCOR  Take 20 mg by mouth nightly.  Dose: 20 mg             Instructions for after discharge     Call provider for:  difficulty breathing, headache or visual disturbances      Call provider for:  extreme fatigue      Call provider for:  persistent dizziness or light-headedness      Call provider for:  persistent nausea or vomiting      Call provider for:  rash      Call provider for:  redness, tenderness, or signs of infection (pain, swelling, redness, odor or  green/yellow discharge around incision site)      Call provider for:  severe uncontrolled pain      Call provider for:  temperature >100.4      Call provider for: blood sugar change      Call provider for blood sugar less than 70 or greater than 350    Discharge diet      Low Fiber/Low Residue    Diet Type / Texture:  Regular  Cardiac (Low Sodium, Low Fat)  Diabetic (Low Carb/Low Fat)       Follow-up with primary physician (PCP)      Follow-up in the next 1-2 weeks, call for an appointment.    Other Follow-up      Follow-up with gastroenterology in the next 1-2 weeks, call for an appointment.    Post-Discharge Activity: Normal activity as tolerated.      Normal activity as tolerated.             PROCEDURES / LABS / IMAGING      Operative Procedures  None.     Other Procedures  none    Pertinent Labs  Results from last 7 days   Lab Units 06/26/22 2037 06/25/22 2341 06/21/22  0446   WBC K/uL 6.32 7.38 6.16   HEMOGLOBIN g/dL 11.3* 11.3* 10.3*   HEMATOCRIT % 35.1 35.0 33.0*   PLATELETS K/uL 283 305 239     Results from last 7 days   Lab Units 06/27/22  1113 06/26/22 2037 06/25/22  2341   SODIUM mEQ/L 144 141 133*   POTASSIUM mEQ/L 3.9 3.1* 4.0   CHLORIDE mEQ/L 105 102 95*   CO2 mEQ/L 31 29 27   BUN mg/dL 6* 8 17   CREATININE mg/dL 1.1 1.1 1.4*   GLUCOSE mg/dL 120* 121* 112*   CALCIUM mg/dL 9.3 9.2 9.2       SARS-CoV-2 (COVID-19) (no units)   Date/Time Value   06/26/2022 0131 Negative       Pertinent Imaging  CT ABDOMEN PELVIS WITHOUT IV CONTRAST    Result Date: 6/19/2022  IMPRESSION: Edema and diffuse stranding surrounding the pancreas in keeping with acute pancreatitis.  There are some areas of low attenuation within the pancreatic body for which developing necrosis is not excluded. Mild fullness of the right collecting system without evidence of obstructing calculus.    CT ABDOMEN PELVIS WITH IV CONTRAST    Result Date: 6/26/2022  IMPRESSION: 1. No evidence for acute inflammatory or obstructive process in the  abdomen and pelvis. Normal appendix is identified in the right lower quadrant. 2. Cystic areas are seen in the body tail possible sidebranch IPMNs. Recommend nonemergent follow-up with MRI/MRCP examination. COMMENT: Technique: CT examination of the abdomen and pelvis was performed utilizing contiguous 2.5 mm transaxial sections. Images were acquired  following the administration of 75 cc Visipaque intravenous contrast.  Post contrast imaging was obtained in the arterial and venous phases of enhancement.  Oral contrast was administered. Coronal and sagittal reformations are obtained. CT DOSE:  One or more dose reduction techniques (e.g. automated exposure control, adjustment of the mA and/or kV according to patient size, use of iterative reconstruction technique) utilized for this examination Comparison studies: Prior CT of the abdomen and pelvis from 6/19/2022.. Lung bases: Lung bases are clear Liver: The liver is normal in size.  There is no focal mass.  Hepatic veins and portal veins are patent without focal filling defects to suggest thrombosis.. Gallbladder:  No gallstones.  No gallbladder wall thickening.. Spleen: Spleen is normal in size without focal mass lesion.. Pancreas: Several cystic areas are identified in the pancreas including an 8 mm area in the body and an 8mm focus in the tail. The pancreatic duct is slightly prominent measuring approximately 3 mm in the tail but normal caliber and caliber in the head and proximal body measuring 2 mm. This may be on the basis of sidebranch IPMN. Further assessment could be performed with a nonemergent MRI.. Adrenals: The adrenals are normal bilaterally without focal mass.. Kidneys, ureters and bladder: No hydronephrosis. No renal calculi. Symmetric enhancement and excretion. 8 mm fat density lesion in the upper pole of the left kidney likely an angiomyolipoma was also present on previous study. Additional subcentimeter hypodensities are seen bilaterally, likely  small cysts. Retroperitoneal structures: There is no abdominal aortic aneurysm.  There is no retroperitoneal adenopathy or retroperitoneal mass.. Bowel and mesentery: No evidence of a focal inflammatory or obstructive process. Normal appendix. Small fat-containing Lymph nodes: No pathologic lymphadenopathy.. Pelvis: Hysterectomy. No adnexal mass. Bones: Multilevel degenerative changes in the lumbar spine    ULTRASOUND ABDOMEN LIMITED    Result Date: 6/20/2022  IMPRESSION: Limited study. Heterogeneous pancreas and liver. No evidence of cholelithiasis. COMMENT: Ultrasound evaluation of the right upper quadrant was performed and compared to a CT study dated 7/19/2022. The study is limited due to overlying bowel gas. The head and body of the pancreas are identified. It has a heterogeneous echotexture consistent with CT diagnosis of pancreatitis. No discrete mass or fluid collection is seen. The tail is obscured. The visualized aorta and inferior vena cava are unremarkable. The liver is slightly heterogeneous in echotexture and measures 16.7 cm in length. There is no intrahepatic ductal dilatation or mass lesion. The common bile duct measures 3.6 mm at the laisha hepatis. The gallbladder is well-visualized. There is no cholelithiasis. The gallbladder wall is 1.3 mm in thickness. Sonographic Machado's sign could not be elicited. There is no pericholecystic fluid. The right kidney is 8.2 cm in length. There is no nephrolithiasis, hydronephrosis or mass lesion. The renal cortex is of normal thickness and slightly increased echotexture.      OUTPATIENT  FOLLOW-UP / REFERRALS / PENDING TESTS        Outpatient Follow-Up Appointments  Encounter Information    This patient does not currently have any appointments scheduled.         Referrals  No orders of the defined types were placed in this encounter.      Test Results Pending at Discharge  Unresulted Labs (From admission, onward)            None          Important Issues to  Address in Follow-Up  Follow-up with PCP and GI  MRI/MRCP recommended in 4-6 weeks     DISCHARGE DISPOSITION AND DESTINATION      Disposition: Home   Destination:                              Code Status At Discharge: Full Code    Physician Order for Life-Sustaining Treatment Document Status      No documents found                   Spent more than 35 minutes in patient evaluation, family update, physical evaluation, and  coordination of discharge and care.

## 2022-06-27 NOTE — DISCHARGE INSTRUCTIONS
CT Abdomen/Pelvis: Cystic areas are seen in the body tail possible sidebranch IPMNs. Recommend nonemergent follow-up with MRI/MRCP examination.   - Follow-up with gastroenterology in the next 1-2 weeks, call for an appointment  - **Please follow-up with gastroenterology for MRCP/MRI imaging in the next 4-6 weeks to follow-up on abnormal findings on CT imaging reported above     Keep to a low fat/low fiber diet. Use Tramadol as needed for severe abdominal pain.

## 2022-06-27 NOTE — PATIENT CARE CONFERENCE
Care Progression Rounds Note  Date: 6/27/2022  Time: 10:35 AM     Patient Name: Andressa Whittington     Medical Record Number: [id number]   YOB: 1950  Sex: Female      Room/Bed: [facility]    Admitting Diagnosis: Pancreatic cyst [K86.2]  Acute pancreatitis, unspecified complication status, unspecified pancreatitis type [K85.90]   Admit Date/Time: 6/25/2022 11:44 PM    Primary Diagnosis: Acute pancreatitis, unspecified complication status, unspecified pancreatitis type  Principal Problem: Acute pancreatitis, unspecified complication status, unspecified pancreatitis type    GMLOS: pending  Anticipated Discharge Date: 6/27/2022    AM-PAC:  Mobility Score:      Discharge Planning:  Concerns to be Addressed: no discharge needs identified  Anticipated Discharge Disposition: home with assistance    Barriers to Discharge:  Medical issues not resolved    Comments:  advance diet, if tolerating dc    Participants:  advanced practice provider, nursing, social work/services

## 2022-06-27 NOTE — PATIENT CARE CONFERENCE
Care Progression Rounds Note  Date: 6/27/2022  Time: 2:53 PM     Patient Name: Andressa Whittington     Medical Record Number: [id number]   YOB: 1950  Sex: Female      Room/Bed: [facility]    Admitting Diagnosis: Pancreatic cyst [K86.2]  Acute pancreatitis, unspecified complication status, unspecified pancreatitis type [K85.90]   Admit Date/Time: 6/25/2022 11:44 PM    Primary Diagnosis: Acute pancreatitis, unspecified complication status, unspecified pancreatitis type  Principal Problem: Acute pancreatitis, unspecified complication status, unspecified pancreatitis type    GMLOS: pending  Anticipated Discharge Date: 6/27/2022    AM-PAC:  Mobility Score:      Discharge Planning:  Concerns to be Addressed: no discharge needs identified  Anticipated Discharge Disposition: home with assistance    Barriers to Discharge:  Other (see comments)    Comments:  dc today pending tolerates diet    Participants:  social work/services, advanced practice provider

## 2022-06-27 NOTE — PLAN OF CARE
Problem: Adult Inpatient Plan of Care  Goal: Readiness for Transition of Care  Outcome: Progressing  Intervention: Mutually Develop Transition Plan  Flowsheets (Taken 6/27/2022 1021)  Anticipated Discharge Disposition: home with assistance  Discharge Coordination/Progress: SW completed chart review as pt is known to author.  Assistive Device/Animal Currently Used at Home: none  Anticipated Changes Related to Illness: none  Concerns Comments: Re-Admit 06/19-06/21 Acute Pancreatitis, no signficant changes  Transportation Concerns: car, none  Readmission Within the Last 30 Days: previous discharge plan unsuccessful  Patient/Family Anticipated Services at Transition: none  Patient/Family Anticipates Transition to: home with family  Transportation Anticipated: family or friend will provide  Concerns to be Addressed: no discharge needs identified  Patient's Choice of Community Agency(s): No Hx HC/SNF/[facility]  Offered/Gave Vendor List: no  Re-Admit 06/19-06/21 Acute Pancreatitis. SIVAKUMRA completed chart review as pt is known to author. Pt visiting son from [place], son resides in [place] in a 2SH. Pt is ind w/ amb, adl's and no use of AD/DME/O2. PCP is Dr. Helen Espinosa in [place], Pharmacy: [facility] and insurance on file. No dc needs identified at this time and plan for family transport.   
Plan of Care Review  Plan of Care Reviewed With: patient  Progress: improving  Outcome Summary: admitted from ED. pts son at bedside and supervisor aware. pt vomited upon arrival to room but then fell asleep. son requested prn compazine because zofran given 1 hour prior to vomiting. spoke with Dr Thrasher and compazine ordered and offered but pt will let us know if she needs it. hydralazine prn  
Plan of Care Review  Plan of Care Reviewed With: patient  Progress: improving  Outcome Summary: speaks ariel but understands good amount of english. ind in room. denies abd pain. no nausea or vomiting. little oral intake, HS . K 3.1 IV potassium given.  
Pt awake  alert and oriented x4 . Cooperative . Denies abdominal pain at present . Tolerating clear liquid diet without nausea/vomiting . Hydralazine given x1 for elevated BP. Will continue to monitor.  
No